# Patient Record
Sex: FEMALE | Race: WHITE | NOT HISPANIC OR LATINO | Employment: OTHER | URBAN - METROPOLITAN AREA
[De-identification: names, ages, dates, MRNs, and addresses within clinical notes are randomized per-mention and may not be internally consistent; named-entity substitution may affect disease eponyms.]

---

## 2023-03-14 LAB
ALANINE AMINOTRANSFERASE (SGPT) (U/L) IN SER/PLAS: 22 U/L (ref 7–45)
ALBUMIN (G/DL) IN SER/PLAS: 4.3 G/DL (ref 3.4–5)
ALKALINE PHOSPHATASE (U/L) IN SER/PLAS: 69 U/L (ref 33–136)
ANION GAP IN SER/PLAS: 13 MMOL/L (ref 10–20)
ASPARTATE AMINOTRANSFERASE (SGOT) (U/L) IN SER/PLAS: 21 U/L (ref 9–39)
BILIRUBIN TOTAL (MG/DL) IN SER/PLAS: 0.4 MG/DL (ref 0–1.2)
CALCIUM (MG/DL) IN SER/PLAS: 9.8 MG/DL (ref 8.6–10.3)
CARBON DIOXIDE, TOTAL (MMOL/L) IN SER/PLAS: 22 MMOL/L (ref 21–32)
CHLORIDE (MMOL/L) IN SER/PLAS: 108 MMOL/L (ref 98–107)
CHOLESTEROL (MG/DL) IN SER/PLAS: 151 MG/DL (ref 0–199)
CHOLESTEROL IN HDL (MG/DL) IN SER/PLAS: 65.5 MG/DL
CHOLESTEROL IN LDL (MG/DL) IN SER/PLAS BY DIRECT ASSAY: 71 MG/DL (ref 0–129)
CHOLESTEROL/HDL RATIO: 2.3
CREATININE (MG/DL) IN SER/PLAS: 1.59 MG/DL (ref 0.5–1.05)
GFR FEMALE: 35 ML/MIN/1.73M2
GLUCOSE (MG/DL) IN SER/PLAS: 86 MG/DL (ref 74–99)
LDL: 69 MG/DL (ref 0–99)
POTASSIUM (MMOL/L) IN SER/PLAS: 4.6 MMOL/L (ref 3.5–5.3)
PROTEIN TOTAL: 7.3 G/DL (ref 6.4–8.2)
SODIUM (MMOL/L) IN SER/PLAS: 138 MMOL/L (ref 136–145)
TRIGLYCERIDE (MG/DL) IN SER/PLAS: 85 MG/DL (ref 0–149)
UREA NITROGEN (MG/DL) IN SER/PLAS: 36 MG/DL (ref 6–23)
VLDL: 17 MG/DL (ref 0–40)

## 2023-05-16 DIAGNOSIS — I10 ESSENTIAL HYPERTENSION: Primary | ICD-10-CM

## 2023-05-16 RX ORDER — AMLODIPINE BESYLATE 10 MG/1
1 TABLET ORAL DAILY
COMMUNITY
End: 2023-05-16 | Stop reason: SDUPTHER

## 2023-05-17 RX ORDER — AMLODIPINE BESYLATE 10 MG/1
10 TABLET ORAL DAILY
Qty: 90 TABLET | Refills: 1 | Status: SHIPPED | OUTPATIENT
Start: 2023-05-17 | End: 2023-05-25 | Stop reason: SDUPTHER

## 2023-05-25 DIAGNOSIS — I10 ESSENTIAL HYPERTENSION: ICD-10-CM

## 2023-05-30 ENCOUNTER — TELEPHONE (OUTPATIENT)
Dept: PRIMARY CARE | Facility: CLINIC | Age: 73
End: 2023-05-30
Payer: MEDICARE

## 2023-05-30 DIAGNOSIS — I10 ESSENTIAL HYPERTENSION: ICD-10-CM

## 2023-05-30 RX ORDER — AMLODIPINE BESYLATE 10 MG/1
10 TABLET ORAL DAILY
Qty: 30 TABLET | Refills: 0 | Status: SHIPPED | OUTPATIENT
Start: 2023-05-30 | End: 2023-07-12 | Stop reason: SDUPTHER

## 2023-06-08 LAB
ALANINE AMINOTRANSFERASE (SGPT) (U/L) IN SER/PLAS: 19 U/L (ref 7–45)
ALBUMIN (G/DL) IN SER/PLAS: 4.4 G/DL (ref 3.4–5)
ALKALINE PHOSPHATASE (U/L) IN SER/PLAS: 74 U/L (ref 33–136)
ANION GAP IN SER/PLAS: 12 MMOL/L (ref 10–20)
ASPARTATE AMINOTRANSFERASE (SGOT) (U/L) IN SER/PLAS: 21 U/L (ref 9–39)
BILIRUBIN TOTAL (MG/DL) IN SER/PLAS: 0.5 MG/DL (ref 0–1.2)
CALCIUM (MG/DL) IN SER/PLAS: 9.9 MG/DL (ref 8.6–10.3)
CARBON DIOXIDE, TOTAL (MMOL/L) IN SER/PLAS: 24 MMOL/L (ref 21–32)
CHLORIDE (MMOL/L) IN SER/PLAS: 95 MMOL/L (ref 98–107)
CREATININE (MG/DL) IN SER/PLAS: 1.42 MG/DL (ref 0.5–1.05)
GFR FEMALE: 40 ML/MIN/1.73M2
GLUCOSE (MG/DL) IN SER/PLAS: 99 MG/DL (ref 74–99)
POC CALCIUM IONIZED (MMOL/L) IN BLOOD: 1.31 MMOL/L (ref 1.1–1.33)
POTASSIUM (MMOL/L) IN SER/PLAS: 4.1 MMOL/L (ref 3.5–5.3)
PROTEIN TOTAL: 7.3 G/DL (ref 6.4–8.2)
SODIUM (MMOL/L) IN SER/PLAS: 127 MMOL/L (ref 136–145)
UREA NITROGEN (MG/DL) IN SER/PLAS: 28 MG/DL (ref 6–23)

## 2023-06-09 LAB — PARATHYRIN INTACT (PG/ML) IN SER/PLAS: 26 PG/ML (ref 18.5–88)

## 2023-06-15 ENCOUNTER — OFFICE VISIT (OUTPATIENT)
Dept: PRIMARY CARE | Facility: CLINIC | Age: 73
End: 2023-06-15
Payer: MEDICARE

## 2023-06-15 VITALS
HEIGHT: 60 IN | TEMPERATURE: 97 F | SYSTOLIC BLOOD PRESSURE: 112 MMHG | OXYGEN SATURATION: 99 % | WEIGHT: 136 LBS | HEART RATE: 68 BPM | BODY MASS INDEX: 26.7 KG/M2 | DIASTOLIC BLOOD PRESSURE: 68 MMHG | RESPIRATION RATE: 16 BRPM

## 2023-06-15 DIAGNOSIS — H40.9 GLAUCOMA, UNSPECIFIED GLAUCOMA TYPE, UNSPECIFIED LATERALITY: ICD-10-CM

## 2023-06-15 DIAGNOSIS — M10.9 GOUT, UNSPECIFIED CAUSE, UNSPECIFIED CHRONICITY, UNSPECIFIED SITE: ICD-10-CM

## 2023-06-15 DIAGNOSIS — R92.1 CALCIFICATION OF LEFT BREAST ON MAMMOGRAPHY: ICD-10-CM

## 2023-06-15 DIAGNOSIS — M85.80 OSTEOPENIA, UNSPECIFIED LOCATION: ICD-10-CM

## 2023-06-15 DIAGNOSIS — Z23 ENCOUNTER FOR IMMUNIZATION: ICD-10-CM

## 2023-06-15 DIAGNOSIS — E66.3 OVERWEIGHT WITH BODY MASS INDEX (BMI) OF 27 TO 27.9 IN ADULT: ICD-10-CM

## 2023-06-15 DIAGNOSIS — E78.2 MIXED HYPERLIPIDEMIA: ICD-10-CM

## 2023-06-15 DIAGNOSIS — Z12.12 SCREENING FOR COLORECTAL CANCER: ICD-10-CM

## 2023-06-15 DIAGNOSIS — I10 ESSENTIAL HYPERTENSION: ICD-10-CM

## 2023-06-15 DIAGNOSIS — E87.1 HYPONATREMIA: ICD-10-CM

## 2023-06-15 DIAGNOSIS — I77.9 CAROTID ARTERY DISEASE, UNSPECIFIED LATERALITY, UNSPECIFIED TYPE (CMS-HCC): ICD-10-CM

## 2023-06-15 DIAGNOSIS — N60.02 CYST OF LEFT BREAST: ICD-10-CM

## 2023-06-15 DIAGNOSIS — N18.31 STAGE 3A CHRONIC KIDNEY DISEASE (MULTI): ICD-10-CM

## 2023-06-15 DIAGNOSIS — R73.03 PREDIABETES: ICD-10-CM

## 2023-06-15 DIAGNOSIS — Z12.11 SCREENING FOR COLORECTAL CANCER: ICD-10-CM

## 2023-06-15 DIAGNOSIS — Z00.00 MEDICARE ANNUAL WELLNESS VISIT, SUBSEQUENT: Primary | ICD-10-CM

## 2023-06-15 PROBLEM — D75.1 ERYTHROCYTOSIS: Status: RESOLVED | Noted: 2023-06-15 | Resolved: 2023-06-15

## 2023-06-15 PROBLEM — H10.10 ALLERGIC CONJUNCTIVITIS: Status: ACTIVE | Noted: 2023-06-15

## 2023-06-15 PROBLEM — R09.89 CAROTID BRUIT: Status: ACTIVE | Noted: 2023-06-15

## 2023-06-15 PROBLEM — L42 PITYRIASIS ROSEA: Status: RESOLVED | Noted: 2022-09-19 | Resolved: 2023-06-15

## 2023-06-15 PROBLEM — F41.9 ANXIETY: Status: RESOLVED | Noted: 2022-08-12 | Resolved: 2023-06-15

## 2023-06-15 PROBLEM — K13.0 ANGULAR CHEILITIS: Status: RESOLVED | Noted: 2023-06-15 | Resolved: 2023-06-15

## 2023-06-15 PROBLEM — J30.2 SEASONAL ALLERGIES: Status: ACTIVE | Noted: 2023-06-15

## 2023-06-15 PROBLEM — F41.9 ANXIETY: Status: ACTIVE | Noted: 2022-08-12

## 2023-06-15 PROCEDURE — 99214 OFFICE O/P EST MOD 30 MIN: CPT | Performed by: FAMILY MEDICINE

## 2023-06-15 PROCEDURE — 1160F RVW MEDS BY RX/DR IN RCRD: CPT | Performed by: FAMILY MEDICINE

## 2023-06-15 PROCEDURE — 1159F MED LIST DOCD IN RCRD: CPT | Performed by: FAMILY MEDICINE

## 2023-06-15 PROCEDURE — G0439 PPPS, SUBSEQ VISIT: HCPCS | Performed by: FAMILY MEDICINE

## 2023-06-15 PROCEDURE — 1036F TOBACCO NON-USER: CPT | Performed by: FAMILY MEDICINE

## 2023-06-15 PROCEDURE — 3078F DIAST BP <80 MM HG: CPT | Performed by: FAMILY MEDICINE

## 2023-06-15 PROCEDURE — 3008F BODY MASS INDEX DOCD: CPT | Performed by: FAMILY MEDICINE

## 2023-06-15 PROCEDURE — 1170F FXNL STATUS ASSESSED: CPT | Performed by: FAMILY MEDICINE

## 2023-06-15 PROCEDURE — 3074F SYST BP LT 130 MM HG: CPT | Performed by: FAMILY MEDICINE

## 2023-06-15 RX ORDER — OLOPATADINE HYDROCHLORIDE 1 MG/ML
1 SOLUTION/ DROPS OPHTHALMIC EVERY 12 HOURS
COMMUNITY
Start: 2020-08-13

## 2023-06-15 RX ORDER — HYDROXYZINE PAMOATE 25 MG/1
25 CAPSULE ORAL 3 TIMES DAILY PRN
COMMUNITY
Start: 2022-08-30

## 2023-06-15 RX ORDER — ALLOPURINOL 100 MG/1
100 TABLET ORAL DAILY
COMMUNITY
End: 2023-08-15 | Stop reason: SDUPTHER

## 2023-06-15 RX ORDER — TRIAMTERENE/HYDROCHLOROTHIAZID 37.5-25 MG
TABLET ORAL EVERY 24 HOURS
COMMUNITY
End: 2024-02-20 | Stop reason: SINTOL

## 2023-06-15 RX ORDER — ATORVASTATIN CALCIUM 40 MG/1
TABLET, FILM COATED ORAL EVERY 24 HOURS
COMMUNITY
End: 2023-11-27 | Stop reason: SDUPTHER

## 2023-06-15 RX ORDER — TRAVOPROST 0.04 MG/ML
SOLUTION/ DROPS OPHTHALMIC EVERY 24 HOURS
COMMUNITY
Start: 2023-06-05

## 2023-06-15 RX ORDER — LISINOPRIL 20 MG/1
20 TABLET ORAL DAILY
COMMUNITY
End: 2023-11-03 | Stop reason: DRUGHIGH

## 2023-06-15 RX ORDER — EZETIMIBE 10 MG/1
TABLET ORAL EVERY 24 HOURS
COMMUNITY
Start: 2022-12-01 | End: 2024-02-13 | Stop reason: SDUPTHER

## 2023-06-15 RX ORDER — FOLIC ACID 0.4 MG
1 TABLET ORAL DAILY
COMMUNITY

## 2023-06-15 RX ORDER — UBIDECARENONE 75 MG
500 CAPSULE ORAL
COMMUNITY
Start: 2022-08-12

## 2023-06-15 RX ORDER — DORZOLAMIDE HYDROCHLORIDE AND TIMOLOL MALEATE 20; 5 MG/ML; MG/ML
SOLUTION/ DROPS OPHTHALMIC EVERY 12 HOURS
COMMUNITY
Start: 2023-03-01

## 2023-06-15 RX ORDER — ASPIRIN 81 MG/1
1 TABLET ORAL DAILY
COMMUNITY

## 2023-06-15 ASSESSMENT — ENCOUNTER SYMPTOMS
SHORTNESS OF BREATH: 0
POLYDIPSIA: 0
DIARRHEA: 0
COUGH: 0
DIZZINESS: 0
CHILLS: 0
PALPITATIONS: 0
LIGHT-HEADEDNESS: 0
HEADACHES: 0
DYSURIA: 0
CONSTIPATION: 0
FEVER: 0
CHEST TIGHTNESS: 0
VOMITING: 0
NUMBNESS: 0
SINUS PAIN: 0
DYSPHORIC MOOD: 0
HEMATURIA: 0
POLYPHAGIA: 0
UNEXPECTED WEIGHT CHANGE: 0
DIAPHORESIS: 0
NERVOUS/ANXIOUS: 0
WHEEZING: 0
ABDOMINAL PAIN: 0
SINUS PRESSURE: 0
NAUSEA: 0
CONFUSION: 0
ADENOPATHY: 0
SORE THROAT: 0
FREQUENCY: 0

## 2023-06-15 ASSESSMENT — ACTIVITIES OF DAILY LIVING (ADL)
DOING_HOUSEWORK: INDEPENDENT
GROCERY_SHOPPING: INDEPENDENT
TAKING_MEDICATION: INDEPENDENT
DRESSING: INDEPENDENT
BATHING: INDEPENDENT
MANAGING_FINANCES: INDEPENDENT

## 2023-06-15 ASSESSMENT — PATIENT HEALTH QUESTIONNAIRE - PHQ9
2. FEELING DOWN, DEPRESSED OR HOPELESS: NOT AT ALL
SUM OF ALL RESPONSES TO PHQ9 QUESTIONS 1 AND 2: 0
1. LITTLE INTEREST OR PLEASURE IN DOING THINGS: NOT AT ALL

## 2023-06-15 NOTE — ASSESSMENT & PLAN NOTE
- DEXA 1/2023 showed normal bone mass.   - continue calcium plus D, weight bearing exercises  - recheck DEXA 1/2025

## 2023-06-15 NOTE — PATIENT INSTRUCTIONS
Jayshree Garcia ,    Thank you for coming in today. We at [facility] appreciate your trust in our care. If you have any questions or concerns about the care you received today, please do not hesitate to contact us at [phone].    The following instructions were discussed today:    - get blood work now to recheck your sodium level  - do cologuard to screen for colon cancer  - get mammogram and breast ultrasound in November 2023  - I recommend you get Tdap (tetanus) vaccine at your pharmacy.    -Follow up in 6 months   -Please get blood work done 1-2 weeks prior to your next visit.   - For blood work: Nothing to eat or drink for at least 10 hours prior. Okay for water or black coffee.

## 2023-06-15 NOTE — PROGRESS NOTES
"Subjective   Reason for Visit: Jayshree Garcia is an 73 y.o. female here for a Medicare Wellness visit.     Past Medical, Surgical, and Family History reviewed and updated in chart.    Reviewed all medications by prescribing practitioner or clinical pharmacist (such as prescriptions, OTCs, herbal therapies and supplements) and documented in the medical record.    routine follow up. chronic issues as per assessment and plan.         Patient Care Team:  Shalonda Multani MD as PCP - General     Review of Systems   Constitutional:  Negative for chills, diaphoresis, fever and unexpected weight change.   HENT:  Negative for congestion, sinus pressure, sinus pain, sneezing and sore throat.    Respiratory:  Negative for cough, chest tightness, shortness of breath and wheezing.    Cardiovascular:  Negative for chest pain, palpitations and leg swelling.   Gastrointestinal:  Negative for abdominal pain, constipation, diarrhea, nausea and vomiting.   Endocrine: Negative for cold intolerance, heat intolerance, polydipsia, polyphagia and polyuria.   Genitourinary:  Negative for dysuria, frequency, hematuria and urgency.   Neurological:  Negative for dizziness, syncope, light-headedness, numbness and headaches.   Hematological:  Negative for adenopathy.   Psychiatric/Behavioral:  Negative for confusion and dysphoric mood. The patient is not nervous/anxious.        Objective   Vitals:  /68 (BP Location: Right arm, Patient Position: Sitting, BP Cuff Size: Adult)   Pulse 68   Temp 36.1 °C (97 °F)   Resp 16   Ht 1.511 m (4' 11.5\")   Wt 61.7 kg (136 lb)   SpO2 99%   BMI 27.01 kg/m²       Physical Exam  Vitals and nursing note reviewed.   Constitutional:       General: She is not in acute distress.     Appearance: Normal appearance.   HENT:      Head: Normocephalic and atraumatic.      Nose: Nose normal.   Eyes:      Extraocular Movements: Extraocular movements intact.      Conjunctiva/sclera: Conjunctivae normal.      " Pupils: Pupils are equal, round, and reactive to light.   Cardiovascular:      Rate and Rhythm: Normal rate and regular rhythm.      Heart sounds: No murmur heard.     No friction rub. No gallop.   Pulmonary:      Effort: Pulmonary effort is normal.      Breath sounds: Normal breath sounds. No wheezing, rhonchi or rales.   Abdominal:      General: Bowel sounds are normal. There is no distension.      Palpations: Abdomen is soft.      Tenderness: There is no abdominal tenderness.   Musculoskeletal:         General: Normal range of motion.      Cervical back: Normal range of motion and neck supple.   Skin:     General: Skin is warm and dry.   Neurological:      General: No focal deficit present.      Mental Status: She is alert and oriented to person, place, and time.      Deep Tendon Reflexes: Reflexes normal.   Psychiatric:         Mood and Affect: Mood normal.         Behavior: Behavior normal.         Thought Content: Thought content normal.         Judgment: Judgment normal.         Assessment/Plan   Problem List Items Addressed This Visit       BMI 27.0-27.9,adult    Current Assessment & Plan     - Encouraged healthy lifestyle, including adequate exercise and high fiber, low fat and low carb diet.          Relevant Orders    CBC and Auto Differential    Comprehensive Metabolic Panel    TSH with reflex to Free T4 if abnormal    Calcification of left breast on mammography    Current Assessment & Plan     - stable on 5/2023 US. Repeat US in 6 months. Bilateral mammogram due at that time as well         Relevant Orders    CBC and Auto Differential    Comprehensive Metabolic Panel    TSH with reflex to Free T4 if abnormal    BI US breast complete left    BI mammo bilateral diagnostic    Carotid artery disease (CMS/HCC)    Current Assessment & Plan     - follows with cardiology          Relevant Orders    CBC and Auto Differential    Comprehensive Metabolic Panel    TSH with reflex to Free T4 if abnormal    Cyst of  left breast    Current Assessment & Plan     - stable on 5/2023 US. Repeat US in 6 months. Bilateral mammogram due at that time as well         Relevant Orders    CBC and Auto Differential    Comprehensive Metabolic Panel    TSH with reflex to Free T4 if abnormal    BI US breast complete left    BI mammo bilateral diagnostic    Essential hypertension    Current Assessment & Plan     - controlled. Continue amlodipine, lisinopril, maxzide          Relevant Orders    CBC and Auto Differential    Comprehensive Metabolic Panel    TSH with reflex to Free T4 if abnormal    Glaucoma    Current Assessment & Plan     - follows with ophthalmology          Relevant Orders    CBC and Auto Differential    Comprehensive Metabolic Panel    TSH with reflex to Free T4 if abnormal    Gout    Current Assessment & Plan     stable on allopurinol. uric acid 5.2          Relevant Orders    CBC and Auto Differential    Comprehensive Metabolic Panel    TSH with reflex to Free T4 if abnormal    Uric acid    Hyponatremia    Current Assessment & Plan     - sodium low at 127  - recheck sodium. If still low, will discontinue maxzide          Relevant Orders    CBC and Auto Differential    Comprehensive Metabolic Panel    TSH with reflex to Free T4 if abnormal    Basic metabolic panel    Mixed hyperlipidemia    Current Assessment & Plan     - controlled with LDL 71  - continue atorvastatin and lisinopril  - recheck labs in 6 months          Relevant Orders    CBC and Auto Differential    Comprehensive Metabolic Panel    Lipid Panel    TSH with reflex to Free T4 if abnormal    Osteopenia    Current Assessment & Plan     - DEXA 1/2023 showed normal bone mass.   - continue calcium plus D, weight bearing exercises  - recheck DEXA 1/2025         Relevant Orders    CBC and Auto Differential    Comprehensive Metabolic Panel    TSH with reflex to Free T4 if abnormal    Overweight with body mass index (BMI) of 27 to 27.9 in adult    Current Assessment &  Plan     - Encouraged healthy lifestyle, including adequate exercise and high fiber, low fat and low carb diet.          Relevant Orders    CBC and Auto Differential    Comprehensive Metabolic Panel    TSH with reflex to Free T4 if abnormal    Prediabetes    Current Assessment & Plan     - Encouraged healthy lifestyle, including adequate exercise and high fiber, low fat and low carb diet.          Relevant Orders    CBC and Auto Differential    Comprehensive Metabolic Panel    TSH with reflex to Free T4 if abnormal    Hemoglobin A1C    Stage 3a chronic kidney disease    Current Assessment & Plan     - follows with nephrology          Relevant Orders    CBC and Auto Differential    Comprehensive Metabolic Panel    TSH with reflex to Free T4 if abnormal     Other Visit Diagnoses       Medicare annual wellness visit, subsequent    -  Primary    Relevant Orders    CBC and Auto Differential    Comprehensive Metabolic Panel    TSH with reflex to Free T4 if abnormal    Encounter for immunization        Relevant Medications    diphth,pertus,acell,,tetanus (BoostRIX) 2.5-8-5 Lf-mcg-Lf/0.5mL injection    Screening for colorectal cancer        Relevant Orders    CBC and Auto Differential    Comprehensive Metabolic Panel    TSH with reflex to Free T4 if abnormal    Cologuard® colon cancer screening

## 2023-06-30 LAB — NONINV COLON CA DNA+OCC BLD SCRN STL QL: NEGATIVE

## 2023-07-12 RX ORDER — AMLODIPINE BESYLATE 10 MG/1
10 TABLET ORAL DAILY
Qty: 90 TABLET | Refills: 1 | Status: SHIPPED | OUTPATIENT
Start: 2023-07-12 | End: 2024-01-03 | Stop reason: SDUPTHER

## 2023-08-15 DIAGNOSIS — M10.9 GOUT, UNSPECIFIED CAUSE, UNSPECIFIED CHRONICITY, UNSPECIFIED SITE: Primary | ICD-10-CM

## 2023-08-15 RX ORDER — ALLOPURINOL 100 MG/1
100 TABLET ORAL DAILY
Qty: 90 TABLET | Refills: 1 | Status: SHIPPED | OUTPATIENT
Start: 2023-08-15 | End: 2023-08-16 | Stop reason: SDUPTHER

## 2023-08-16 DIAGNOSIS — M10.9 GOUT, UNSPECIFIED CAUSE, UNSPECIFIED CHRONICITY, UNSPECIFIED SITE: ICD-10-CM

## 2023-08-16 RX ORDER — ALLOPURINOL 100 MG/1
100 TABLET ORAL DAILY
Qty: 90 TABLET | Refills: 1 | Status: SHIPPED | OUTPATIENT
Start: 2023-08-16 | End: 2024-02-12

## 2023-08-16 NOTE — TELEPHONE ENCOUNTER
Patient is the one who called to request this, so she is currently taking. She is allergic to a specific brand of allopurinol, but it is only given through [facility]. She will be able to get a safe option through [facility]

## 2023-08-31 LAB
ALBUMIN (G/DL) IN SER/PLAS: 4.9 G/DL (ref 3.4–5)
ANION GAP IN SER/PLAS: 14 MMOL/L (ref 10–20)
CALCIDIOL (25 OH VITAMIN D3) (NG/ML) IN SER/PLAS: 40 NG/ML
CALCIUM (MG/DL) IN SER/PLAS: 10.4 MG/DL (ref 8.6–10.3)
CARBON DIOXIDE, TOTAL (MMOL/L) IN SER/PLAS: 23 MMOL/L (ref 21–32)
CHLORIDE (MMOL/L) IN SER/PLAS: 95 MMOL/L (ref 98–107)
CREATININE (MG/DL) IN SER/PLAS: 1.39 MG/DL (ref 0.5–1.05)
CREATININE (MG/DL) IN URINE: 33.7 MG/DL (ref 20–320)
ERYTHROCYTE DISTRIBUTION WIDTH (RATIO) BY AUTOMATED COUNT: 12.6 % (ref 11.5–14.5)
ERYTHROCYTE MEAN CORPUSCULAR HEMOGLOBIN CONCENTRATION (G/DL) BY AUTOMATED: 33.4 G/DL (ref 32–36)
ERYTHROCYTE MEAN CORPUSCULAR VOLUME (FL) BY AUTOMATED COUNT: 89 FL (ref 80–100)
ERYTHROCYTES (10*6/UL) IN BLOOD BY AUTOMATED COUNT: 4.66 X10E12/L (ref 4–5.2)
GFR FEMALE: 40 ML/MIN/1.73M2
GLUCOSE (MG/DL) IN SER/PLAS: 97 MG/DL (ref 74–99)
HEMATOCRIT (%) IN BLOOD BY AUTOMATED COUNT: 41.6 % (ref 36–46)
HEMOGLOBIN (G/DL) IN BLOOD: 13.9 G/DL (ref 12–16)
LEUKOCYTES (10*3/UL) IN BLOOD BY AUTOMATED COUNT: 6.6 X10E9/L (ref 4.4–11.3)
PHOSPHATE (MG/DL) IN SER/PLAS: 4.1 MG/DL (ref 2.5–4.9)
PLATELETS (10*3/UL) IN BLOOD AUTOMATED COUNT: 331 X10E9/L (ref 150–450)
POTASSIUM (MMOL/L) IN SER/PLAS: 4.4 MMOL/L (ref 3.5–5.3)
PROTEIN (MG/DL) IN URINE: <4 MG/DL (ref 5–24)
PROTEIN/CREATININE (MG/MG) IN URINE: ABNORMAL MG/MG CREAT (ref 0–0.17)
SODIUM (MMOL/L) IN SER/PLAS: 128 MMOL/L (ref 136–145)
UREA NITROGEN (MG/DL) IN SER/PLAS: 30 MG/DL (ref 6–23)

## 2023-09-01 LAB — PARATHYRIN INTACT (PG/ML) IN SER/PLAS: 27.4 PG/ML (ref 18.5–88)

## 2023-10-30 PROBLEM — I25.84 CORONARY ARTERY CALCIFICATION: Status: ACTIVE | Noted: 2023-10-30

## 2023-10-30 PROBLEM — I25.10 CORONARY ARTERY CALCIFICATION: Status: ACTIVE | Noted: 2023-10-30

## 2023-10-30 NOTE — PROGRESS NOTES
[facility] V[facility] Cardiology    Patient Name: Jayshree Garcia  Patient : 1950      Scribe Attestation  By signing my name below, IJackie Scribe   attest that this documentation has been prepared under the direction and in the presence of Jorge Farrell DO.       Reason for visit:  This is a 73-year-old female here for follow-up regarding coronary artery calcification, hypertension, dyslipidemia, and mild carotid artery disease.  The patient was last evaluated by me in 2022.     HPI:  This is a 73-year-old female here for follow-up regarding coronary artery calcification, hypertension, dyslipidemia, and mild carotid artery disease.  The patient was last evaluated by me in 2022.  At that visit I had recommended a coronary calcium score and asked the patient to follow-up in 1 year and sooner if necessary.  Coronary calcium score done in 2022 showed a total score of 1023.25 placing the patient in a high risk group.  I subsequently asked the patient to increase atorvastatin to 40 mg daily and add Zetia 10 mg daily for additional cholesterol reduction and ordered repeat CMP/lipid panel to be drawn in 3 months.  BMP done in 2023 showed a serum sodium of 128, serum potassium of 4.4, serum creatinine of 1.39 consistent with known CKD, CBC showed a hemoglobin of 13.9.  Lipid panel done in 2023 showed an LDL cholesterol of 69 and triglycerides of 85 while on atorvastatin 40 mg daily and Zetia 10 mg daily. ECG done today showed sinus rhythm with a heart rate of 77 bpm. The patient reports that she has been feeling generally well from the cardiac standpoint. She denies any new chest pain, shortness of breath, palpitations and lightheadedness. She states that her usual blood pressure at home is in the 120s range. She states that her PCP had recently adjusted her blood pressure medications. She states that she takes all of her medications as  prescribed. During my exam, she was resting comfortably on the exam table.        Assessment/Plan:   1. Coronary artery calcification  The previously reported chest pain has resolved.   Coronary calcium score done in November 2022 showed a total score of 1023.25 placing the patient in a high risk group.   ECG done today showed sinus rhythm with a heart rate of 77 bpm.   She denies anginal chest discomfort.  Blood pressure appears moderately controlled on exam today.   She should continue current antihypertensive medications and antiplatelet therapy.  Echocardiogram done on 10/27/2022 showed normal left ventricular systolic function with an ejection fraction of 60-65%, normal right ventricular systolic function, and no significant valve abnormalities.  Recent lab works as noted in the HPI.  Lipid panel done in March 2023 showed an LDL cholesterol of 69 and triglycerides of 85 while on atorvastatin 40 mg daily and Zetia 10 mg daily.  Lab works as noted below will be done in 6 months prior to his next visit.   Please see lifestyle recommendations below.  Follow up in 1 year and sooner if necessary.      2. Hypertension  The patient has a history of hypertension and blood pressure appears moderately controlled on exam today.   She states that her usual blood pressure at home is in the 120s range.  She should continue her current antihypertensive medications.      3. Dyslipidemia  Lipid panel done in March 2023 showed an LDL cholesterol of 69 and triglycerides of 85 while on atorvastatin 40 mg daily and Zetia 10 mg daily.  I will update lipid panel in 6 months.  Please see lifestyle recommendations below.      4. Carotid artery disease  Carotid artery duplex ultrasound done on 10/27/22 showed findings consistent with less than 50% stenosis of the right and left proximal ICA.   I will continue to monitor clinically for now.  Continue risk factor modification.    5. Lower extremity edema  The patient has 1+ pitting  bilateral lower extremity on exam today.  I discussed with him the importance of following a low-sodium heart healthy diet, wearing compression stockings and elevating legs when seated.     Orders:  CMP/lipid/magnesium/CBC in 6 months,   Follow-up in 1 year.      Lifestyle Recommendations  I recommend a whole-food plant-based diet, an eating pattern that encourages the consumption of unrefined plant foods (such as fruits, vegetables, tubers, whole grains, legumes, nuts and seeds) and discourages meats, dairy products, eggs and processed foods.     The AHA/ACC recommends that the patient consume a dietary pattern that emphasizes intake of vegetables, fruits, and whole grains; includes low-fat dairy products, poultry, fish, legumes, non-tropical vegetable oils, and nuts; and limits intake of sodium, sweets, sugar-sweetened beverages, and red meats.  Adapt this dietary pattern to appropriate calorie requirements (a 500-750 kcal/day deficit to loose weight), personal and cultural food preferences, and nutrition therapy for other medical conditions (including diabetes).  Achieve this pattern by following plans such as the [place] Mediterranean, DASH dietary pattern, or AHA diet.     Engage in 2 hours and 30 minutes per week of moderate-intensity physical activity, or 1 hour and 15 minutes (75 minutes) per week of vigorous-intensity aerobic physical activity, or an equivalent combination of moderate and vigorous-intensity aerobic physical activity. Aerobic activity should be performed in episodes of at least 10 minutes preferably spread throughout the week.     Adhering to a heart healthy diet, regular exercise habits, avoidance of tobacco products, and maintenance of a healthy weight are crucial components of their heart disease risk reduction.     Any positive review of systems not specifically addressed in the office visit today should be evaluated and treated by the patients primary care physician or in an emergency  department if necessary     Patient was notified that results from ordered tests will be called to the patient if it changes current management; it will otherwise be discussed at a future appointment and available on [facility].     Thank you for allowing me to participate in the care of this patient.        This document was generated using the assistance of voice recognition software. If there are any errors of spelling, grammar, syntax, or meaning; please feel free to contact me directly for clarification.    Past Medical History:  She has a past medical history of Acute atopic conjunctivitis, bilateral (06/09/2020), Acute vaginitis (11/09/2020), Cervical high risk human papillomavirus (HPV) DNA test positive, Encounter for other screening for malignant neoplasm of breast (10/21/2019), Erythrocytosis (06/15/2023), Mixed hyperlipidemia (10/06/2022), Mixed hyperlipidemia (10/06/2022), Mixed hyperlipidemia (10/06/2022), Mixed hyperlipidemia (10/06/2022), Other abnormal and inconclusive findings on diagnostic imaging of breast (11/04/2019), Other seasonal allergic rhinitis (06/09/2020), Personal history of diseases of the skin and subcutaneous tissue (09/19/2022), Personal history of other diseases of the digestive system (09/25/2019), Personal history of other drug therapy (12/03/2019), Personal history of other medical treatment (11/17/2022), Personal history of other specified conditions (11/04/2022), Pityriasis rosea (09/19/2022), Secondary polycythemia (08/15/2022), Secondary polycythemia (08/15/2022), and Secondary polycythemia (08/15/2022).    Past Surgical History:  She has a past surgical history that includes Other surgical history (10/21/2019).      Social History:  She reports that she has quit smoking. Her smoking use included cigarettes. She has never used smokeless tobacco. She reports current alcohol use of about 3.0 standard drinks of alcohol per week. She reports that she does not use  "drugs.    Family History:  No family history on file.     Allergies:  Acetaminophen, Allopurinol, Amlodipine, Atorvastatin, and Latanoprost    Outpatient Medications:  Current Outpatient Medications   Medication Instructions    allopurinol (ZYLOPRIM) 100 mg, oral, Daily    amLODIPine (NORVASC) 10 mg, oral, Daily    aspirin 81 mg EC tablet 1 tablet, oral, Daily    atorvastatin (Lipitor) 40 mg tablet Every 24 hours    cyanocobalamin (VITAMIN B-12) 500 mcg, oral, Daily RT    dorzolamide-timoloL (Cosopt) 22.3-6.8 mg/mL ophthalmic solution Every 12 hours    ezetimibe (Zetia) 10 mg tablet Every 24 hours    folic acid (Folvite) 400 mcg tablet 1 tablet, oral, Daily    hydrOXYzine pamoate (VISTARIL) 25 mg, oral, 3 times daily PRN    lisinopril 20 mg, oral, Daily    olopatadine (Patanol) 0.1 % ophthalmic solution 1 drop, Both Eyes, Every 12 hours    Travatan Z 0.004 % drops ophthalmic solution Every 24 hours    triamterene-hydrochlorothiazid (Maxzide-25) 37.5-25 mg tablet Every 24 hours        ROS:  A 14 point review of systems was done and is negative other than as stated in HPI    Vitals:      8/15/2022     4:14 PM 9/19/2022    11:18 AM 9/28/2022     3:09 PM 11/4/2022     1:39 PM 11/17/2022    10:18 AM 12/13/2022     8:43 AM 6/15/2023    10:09 AM   Vitals   Systolic 162 153 118 122 102 128 112   Diastolic 98 72 74 88 76 74 68   Heart Rate  89 77 76  74 68   Temp  36.1 °C (97 °F)     36.1 °C (97 °F)   Resp  16  16  16 16   Height (in)   1.49 m (4' 10.66\") 1.486 m (4' 10.5\") 1.486 m (4' 10.5\") 1.486 m (4' 10.5\") 1.511 m (4' 11.5\")   Weight (lb)   135 130 134.5 136 136   BMI   27.58 kg/m2 26.71 kg/m2 27.63 kg/m2 27.94 kg/m2 27.01 kg/m2   BSA (m2)   1.59 m2 1.56 m2 1.59 m2 1.6 m2 1.61 m2        Physical Exam:   Constitutional: Cooperative, in no acute distress, alert, appears stated age.   Skin: Skin color, texture, turgor normal. No rashes or lesions.   Head: Normocephalic. No masses, lesions, tenderness or abnormalities "   Eyes: Extraocular movements are grossly intact.   Mouth and throat: Mucous membranes moist   Neck: Neck supple, right carotid bruit, no JVD   Respiratory: Lungs clear to auscultation, no wheezing or rhonchi, no use of accessory muscles   Chest wall: No scars, normal excursion with respiration   Cardiovascular: Regular rhythm, + murmur  Gastrointestinal: Abdomen soft, nontender. Bowel sounds normal.   Musculoskeletal: Strength equal in upper extremities   Extremities: 1+ pitting edema   Neurologic: Sensation grossly intact, alert and oriented x3.    Intake/Output:   No intake/output data recorded.    Outpatient Medications  Current Outpatient Medications on File Prior to Visit   Medication Sig Dispense Refill    allopurinol (Zyloprim) 100 mg tablet Take 1 tablet (100 mg) by mouth once daily. 90 tablet 1    amLODIPine (Norvasc) 10 mg tablet Take 1 tablet (10 mg) by mouth once daily. 90 tablet 1    aspirin 81 mg EC tablet Take 1 tablet (81 mg) by mouth once daily.      atorvastatin (Lipitor) 40 mg tablet once every 24 hours.      cyanocobalamin (Vitamin B-12) 500 mcg tablet Take 1 tablet (500 mcg) by mouth once daily.      dorzolamide-timoloL (Cosopt) 22.3-6.8 mg/mL ophthalmic solution every 12 hours.      ezetimibe (Zetia) 10 mg tablet once every 24 hours.      folic acid (Folvite) 400 mcg tablet Take 1 tablet (0.4 mg) by mouth once daily.      hydrOXYzine pamoate (Vistaril) 25 mg capsule Take 1 capsule (25 mg) by mouth 3 times a day as needed for anxiety.      lisinopril 20 mg tablet Take 1 tablet (20 mg) by mouth once daily.      olopatadine (Patanol) 0.1 % ophthalmic solution Administer 1 drop into both eyes every 12 hours.      Travatan Z 0.004 % drops ophthalmic solution once every 24 hours.      triamterene-hydrochlorothiazid (Maxzide-25) 37.5-25 mg tablet once every 24 hours.       No current facility-administered medications on file prior to visit.       Labs: (past 26 weeks)  Recent Results (from the past  4368 hour(s))   CALCIUM, IONIZED    Collection Time: 06/08/23  9:37 AM   Result Value Ref Range    Calcium, Ion 1.31 1.10 - 1.33 mmol/L   Comprehensive Metabolic Panel    Collection Time: 06/08/23  9:37 AM   Result Value Ref Range    Glucose 99 74 - 99 mg/dL    Sodium 127 (L) 136 - 145 mmol/L    Potassium 4.1 3.5 - 5.3 mmol/L    Chloride 95 (L) 98 - 107 mmol/L    Bicarbonate 24 21 - 32 mmol/L    Anion Gap 12 10 - 20 mmol/L    Urea Nitrogen 28 (H) 6 - 23 mg/dL    Creatinine 1.42 (H) 0.50 - 1.05 mg/dL    GFR Female 40 (A) >90 mL/min/1.73m2    Calcium 9.9 8.6 - 10.3 mg/dL    Albumin 4.4 3.4 - 5.0 g/dL    Alkaline Phosphatase 74 33 - 136 U/L    Total Protein 7.3 6.4 - 8.2 g/dL    AST 21 9 - 39 U/L    Total Bilirubin 0.5 0.0 - 1.2 mg/dL    ALT (SGPT) 19 7 - 45 U/L   Parathyroid Hormone, Intact    Collection Time: 06/08/23  9:37 AM   Result Value Ref Range    PTH 26.0 18.5 - 88.0 pg/mL   Cologuard® colon cancer screening    Collection Time: 06/25/23  2:05 PM   Result Value Ref Range    NONINV COLON CA DNA+OCC BLD SCRN STL QL Negative Negative   Vitamin D 25-Hydroxy,Total (for eval of Vitamin D levels)    Collection Time: 08/31/23 10:46 AM   Result Value Ref Range    Vitamin D, 25-Hydroxy 40 ng/mL   Parathyroid Hormone, Intact    Collection Time: 08/31/23 10:46 AM   Result Value Ref Range    PTH 27.4 18.5 - 88.0 pg/mL   Protein, Urine Random    Collection Time: 08/31/23 10:46 AM   Result Value Ref Range    Protein, Ur <4 (L) 5 - 24 mg/dL    Creatinine, Urine 33.7 20.0 - 320.0 mg/dL    Prot/Creat, Ur SEE COMMENT 0.00 - 0.17 mg/mg Creat   Renal Function Panel    Collection Time: 08/31/23 10:46 AM   Result Value Ref Range    Glucose 97 74 - 99 mg/dL    Sodium 128 (L) 136 - 145 mmol/L    Potassium 4.4 3.5 - 5.3 mmol/L    Chloride 95 (L) 98 - 107 mmol/L    Bicarbonate 23 21 - 32 mmol/L    Anion Gap 14 10 - 20 mmol/L    Urea Nitrogen 30 (H) 6 - 23 mg/dL    Creatinine 1.39 (H) 0.50 - 1.05 mg/dL    GFR Female 40 (A) >90  mL/min/1.73m2    Calcium 10.4 (H) 8.6 - 10.3 mg/dL    Phosphorus 4.1 2.5 - 4.9 mg/dL    Albumin 4.9 3.4 - 5.0 g/dL   CBC    Collection Time: 08/31/23 10:46 AM   Result Value Ref Range    WBC 6.6 4.4 - 11.3 x10E9/L    RBC 4.66 4.00 - 5.20 x10E12/L    Hemoglobin 13.9 12.0 - 16.0 g/dL    Hematocrit 41.6 36.0 - 46.0 %    MCV 89 80 - 100 fL    MCHC 33.4 32.0 - 36.0 g/dL    Platelets 331 150 - 450 x10E9/L    RDW 12.6 11.5 - 14.5 %       ECG  No results found for this or any previous visit (from the past 4464 hour(s)).    Echocardiogram  No results found for this or any previous visit from the past 1095 days.      CV Studies:  EKG:No results found for this or any previous visit (from the past 4464 hour(s)).  Echocardiogram:   [place] [address]  Phone [phone] Fax [phone]    TRANSTHORACIC ECHOCARDIOGRAM REPORT      Patient Name:     JANICE Damon Physician:  [id number] Mathieu RODRIGEZ MD  Study Date:       10/27/2022       Referring           SOLA LUJAN  Physician:  MRN/PID:          [id number]         PCP:  Accession/Order#: [id number]     [facility] Echo Lab  Location:  YOB: 1950        Fellow:  Gender:           F                Nurse:  Admit Date:                        Sonographer:        Yamilka Ryan [facility]  Admission Status: Outpatient       Additional Staff:  Height:           149.86 cm        CC Report to:  Weight:           61.23 kg         Study Type:         Echocardiogram  BSA:              1.56 m2  Blood Pressure: 118 /74 mmHg    Diagnosis/ICD: I10-Essential (primary) hypertension; R07.9-Chest pain,  unspecified  Indication:    Hypertension, Chest Pain  Procedure/CPT: Echo Complete w Full [id number]    Patient History:  Pertinent History: HTN.    Study Detail: The following Echo studies were performed: 2D, M-Mode, Doppler and  color  flow.      PHYSICIAN INTERPRETATION:  Left Ventricle: Left ventricular systolic function is normal, with an estimated ejection fraction of 60-65%. There are no regional wall motion abnormalities. The left ventricular cavity size is normal. Spectral Doppler shows a normal pattern of left ventricular diastolic filling.  Left Atrium: The left atrium is normal in size.  Right Ventricle: The right ventricle is normal in size. There is normal right ventricular global systolic function.  Right Atrium: The right atrium is normal in size.  Aortic Valve: The aortic valve is probably trileaflet. There is no evidence of aortic valve regurgitation. The peak instantaneous gradient of the aortic valve is 11.3 mmHg. The mean gradient of the aortic valve is 6.0 mmHg.  Mitral Valve: The mitral valve is normal in structure. There is no evidence of mitral valve regurgitation.  Tricuspid Valve: The tricuspid valve is structurally normal. No evidence of tricuspid regurgitation.  Pulmonic Valve: The pulmonic valve is structurally normal. There is physiologic pulmonic valve regurgitation.  Pericardium: There is no pericardial effusion noted.  Aorta: The aortic root is normal.      CONCLUSIONS:  1. Left ventricular systolic function is normal with a 60-65% estimated ejection fraction.    QUANTITATIVE DATA SUMMARY:  2D MEASUREMENTS:  Normal Ranges:  LAs:           3.19 cm   (2.7-4.0cm)  IVSd:          0.81 cm   (0.6-1.1cm)  LVPWd:         0.85 cm   (0.6-1.1cm)  LVIDd:         3.66 cm   (3.9-5.9cm)  LVIDs:         2.42 cm  LV Mass Index: 54.3 g/m2  LV % FS        34.0 %    LA VOLUME:  Normal Ranges:  LA Vol A4C:        36.7 ml    (22+/-6mL/m2)  LA Vol A2C:        29.0 ml  LA Vol BP:         33.3 ml  LA Vol Index A4C:  23.5 ml/m2  LA Vol Index A2C:  18.6 ml/m2  LA Vol Index BP:   21.4 ml/m2  LA Area A4C:       14.7 cm2  LA Area A2C:       12.8 cm2  LA Major Axis A4C: 5.0 cm  LA Major Axis A2C: 4.8 cm  LA Volume Index:   21.0 ml/m2  LA Vol A4C:         35.1 ml  LA Vol A2C:        27.9 ml    RA VOLUME BY A/L METHOD:  Normal Ranges:  RA Area A4C: 11.5 cm2    AORTA MEASUREMENTS:  Normal Ranges:  Ao Sinus, d: 2.70 cm (2.1-3.5cm)  Ao STJ, d:   2.40 cm (1.7-3.4cm)  Asc Ao, d:   2.80 cm (2.1-3.4cm)    LV SYSTOLIC FUNCTION BY 2D PLANIMETRY (MOD):  Normal Ranges:  EF-A4C View: 69.6 % (>55%)  EF-A2C View: 65.7 %  EF-Biplane:  65.0 %    LV DIASTOLIC FUNCTION:  Normal Ranges:  MV Peak E:        0.81 m/s    (0.7-1.2 m/s)  MV Peak A:        0.89 m/s    (0.42-0.7 m/s)  E/A Ratio:        0.91        (1.0-2.2)  MV e'             0.10 m/s    (>8.0)  MV lateral e'     0.11 m/s  MV medial e'      0.09 m/s  MV A Dur:         117.03 msec  E/e' Ratio:       8.08        (<8.0)  PulmV A Revs Dur: 142.72 msec    MITRAL VALVE:  Normal Ranges:  MV DT: 190 msec (150-240msec)    AORTIC VALVE:  Normal Ranges:  AoV Vmax:                1.68 m/s  (<1.7m/s)  AoV Peak P.3 mmHg (<20mmHg)  AoV Mean P.0 mmHg  (1.7-11.5mmHg)  LVOT Max Filiberto:            1.57 m/s  (<1.1m/s)  AoV VTI:                 36.75 cm  (18-25cm)  LVOT VTI:                31.61 cm  LVOT Diameter:           1.78 cm   (1.8-2.4cm)  AoV Area, VTI:           2.14 cm2  (2.5-5.5cm2)  AoV Area,Vmax:           2.32 cm2  (2.5-4.5cm2)  AoV Dimensionless Index: 0.86    RIGHT VENTRICLE:  RV 1   3.0 cm  RV 2   2.1 cm  RV 3   6.6 cm  TAPSE: 20.2 mm  RV s'  0.13 m/s    TRICUSPID VALVE/RVSP:  Normal Ranges:  Peak TR Velocity: 1.09 m/s  RV Syst Pressure: 7.7 mmHg (< 30mmHg)  TV E Vmax:        0.45 m/s (0.3-0.7m/s)  TV A Vmax:        0.57 m/s  IVC Diam:         1.40 cm    Pulmonary Veins:  PulmV A Revs Dur: 142.72 msec    AORTA:  Asc Ao Diam 2.80 cm      [id number] Mathieu Mercer MD  Electronically signed on 10/27/2022 at 5:02:11 PM         Final     Stress Testing IMGRESULT([id number]:1:1825):   NM cardiac stress rest (myocardial perfusion MIBI) 10/07/2022    Narrative  MRN: [id number]  Patient Name: RAIN  JANICE    STUDY:  CARDIAC STRESS/REST INJECTION; PART 2 STRESS OR REST (NO CHARGE);  CARDIAC STRESS/REST (MYOCARDIAL PERFUSION/MIBI);  10/7/2022 11:11 am    INDICATION:  Chest Pain  I10: Essential hypertension E78.2: Mixed hyperlipidemia  R07.9: Chest pain.    COMPARISON:  None.    ACCESSION NUMBER(S):  [id number]; [id number]; [id number]    ORDERING CLINICIAN:  SOLA LUJAN    TECHNIQUE:  DIVISION OF NUCLEAR MEDICINE  STRESS MYOCARDIAL PERFUSION SCAN, ONE DAY PROTOCOL    The patient received an intravenous dose of  11.1 mCi of Tc-99m  tetrofosmin and resting emission tomographic (SPECT) images of the  myocardium were acquired. The patient then exercised via treadmill  stress to  101 % of MPHR and achieved  7.6 METS. At peak stress  34.6  mCi of Tc-99m tetrofosmin were administered and stress phase SPECT  images of the myocardium were then acquired. These included ECG-gated  images to assess and quantify ventricular function.    FINDINGS:    There is normal perfusion in all major segments. There is normal wall  motion and normal left ventricular function . The gated ejection  fraction is  92%(Normal over 50%).    Impression  1. Normal perfusion without evidence of ischemia or prior infarct.  2. Normal left ventricular systolic function.    Cardiac Catheterization: No results found for this or any previous visit from the past 1825 days.  No results found for this or any previous visit from the past 3650 days.     Cardiac Scoring:   CT heart calcium scoring wo IV contrast 11/30/2022    Narrative  MRN: [id number]  Patient Name: JANICE RODRIGEZ    STUDY:  CT CARDIAC SCORING;  11/30/2022 7:26 am    INDICATION:  Chest Pain  I10: Essential hypertension E78.2: Mixed hyperlipidemia  R07.9: Chest pain.    COMPARISON:  None.    ACCESSION NUMBER(S):  [id number]    ORDERING CLINICIAN:  SOLA LUJAN    TECHNIQUE:  Using prospective ECG gating, CT scan of the coronary arteries was  performed without intravenous contrast. Coronary calcium  "scoring  was  performed according to the method of Agatston.    FINDINGS:  The score and distribution of calcium in the coronary arteries is as  follows:    Left Main Coronary: 0.  Left Anterior Descendin.2.  Left Circumflex: 0.  Right Coronary Artery: 460.05.    Total: 1023.25.    There is bilateral dependent atelectasis.    The aorta is without aneurysmal dilatation evident.Calcified  atheromatous disease is seen of the aorta.    The heart is not enlarged. No pericardial effusion is evident.    No hilar or mediastinal lymphadenopathy is evident.    There is a hypodense structure in the left lobe of the liver which is  too small fully characterize but likely represents a cyst.    Impression  1. Coronary artery calcium score of 1023.25*.    *Coronary artery calcium scoring may be helpful in predicting the  risk for future coronary heart disease events.  According to the  American College of Cardiology Foundation Clinical Expert Consensus  Task Force, such testing provides important prognostic information in  patients with more than one coronary heart disease risk factor. The  coronary artery calcium score correlates with the annual risk of a  non-fatal myocardial infarction or coronary heart disease death.    Coronary artery score            Annual Risk    0-99                             0.4%  100-399                        1.3%  >400                            2.4%    These three \"breakpoints\" correspond to lower, intermediate and high  risk states for future coronary events.  Such information should be  used, along with appropriate clinical judgment, to make decisions  regarding the intensity of risk factor management strategies to treat  blood lipids and to modify other non-lipid coronary risk factors.    Reference: Ririe P et al. Circulation.  2007; 115:402-426    AAA : No results found for this or any previous visit from the past 1825 days.    OTHER: No results found for this or any previous visit " from the past 1825 days.    LAST IMAGING RESULTS  US renal complete  Narrative: Interpreted By:  DA CABALLERO MD  MRN: [id number]  Patient Name: JANICE RODRIGEZ     STUDY:  US RENAL BILAT;  9/18/2023 11:19 am     INDICATION:  kidney disease.     COMPARISON:  None.     ACCESSION NUMBER(S):  [id number]     ORDERING CLINICIAN:  MURIEL CONNER     TECHNIQUE:  Multiple images of the kidneys were obtained.     FINDINGS:  RIGHT KIDNEY:  9.4 cm in length. No hydronephrosis. No focal renal abnormality.     LEFT KIDNEY:  9 cm in length. No hydronephrosis. No focal renal abnormality.     BLADDER:  Unremarkable for degree of distention.     Impression: Diffusely increased renal cortical echogenicity suggestive of medical  renal disease.      Problem List Items Addressed This Visit       Carotid artery disease (CMS/HCC)    Relevant Orders    ECG 12 Lead (Completed)    Comprehensive Metabolic Panel    Lipid Panel    Magnesium    CBC    Follow Up In Cardiology    Essential hypertension    Relevant Orders    ECG 12 Lead (Completed)    Comprehensive Metabolic Panel    Lipid Panel    Magnesium    CBC    Follow Up In Cardiology    Mixed hyperlipidemia    Relevant Orders    ECG 12 Lead (Completed)    Comprehensive Metabolic Panel    Lipid Panel    Magnesium    CBC    Follow Up In Cardiology    Coronary artery calcification - Primary    Relevant Orders    ECG 12 Lead (Completed)    Comprehensive Metabolic Panel    Lipid Panel    Magnesium    CBC    Follow Up In Cardiology    Bilateral lower extremity edema            Jorge Farrell DO, FACC, FACOI

## 2023-11-03 ENCOUNTER — OFFICE VISIT (OUTPATIENT)
Dept: CARDIOLOGY | Facility: CLINIC | Age: 73
End: 2023-11-03
Payer: MEDICARE

## 2023-11-03 VITALS
SYSTOLIC BLOOD PRESSURE: 144 MMHG | BODY MASS INDEX: 27.7 KG/M2 | HEIGHT: 59 IN | HEART RATE: 77 BPM | WEIGHT: 137.4 LBS | DIASTOLIC BLOOD PRESSURE: 86 MMHG

## 2023-11-03 DIAGNOSIS — E78.2 MIXED HYPERLIPIDEMIA: ICD-10-CM

## 2023-11-03 DIAGNOSIS — R60.0 BILATERAL LOWER EXTREMITY EDEMA: ICD-10-CM

## 2023-11-03 DIAGNOSIS — I65.23 BILATERAL CAROTID ARTERY STENOSIS: ICD-10-CM

## 2023-11-03 DIAGNOSIS — I25.10 CORONARY ARTERY CALCIFICATION: Primary | ICD-10-CM

## 2023-11-03 DIAGNOSIS — I25.84 CORONARY ARTERY CALCIFICATION: Primary | ICD-10-CM

## 2023-11-03 DIAGNOSIS — I10 ESSENTIAL HYPERTENSION: ICD-10-CM

## 2023-11-03 PROCEDURE — 3079F DIAST BP 80-89 MM HG: CPT | Performed by: INTERNAL MEDICINE

## 2023-11-03 PROCEDURE — 93000 ELECTROCARDIOGRAM COMPLETE: CPT | Performed by: INTERNAL MEDICINE

## 2023-11-03 PROCEDURE — 3008F BODY MASS INDEX DOCD: CPT | Performed by: INTERNAL MEDICINE

## 2023-11-03 PROCEDURE — 99214 OFFICE O/P EST MOD 30 MIN: CPT | Performed by: INTERNAL MEDICINE

## 2023-11-03 PROCEDURE — 3077F SYST BP >= 140 MM HG: CPT | Performed by: INTERNAL MEDICINE

## 2023-11-03 PROCEDURE — 1159F MED LIST DOCD IN RCRD: CPT | Performed by: INTERNAL MEDICINE

## 2023-11-03 PROCEDURE — 1160F RVW MEDS BY RX/DR IN RCRD: CPT | Performed by: INTERNAL MEDICINE

## 2023-11-03 PROCEDURE — 1036F TOBACCO NON-USER: CPT | Performed by: INTERNAL MEDICINE

## 2023-11-16 ENCOUNTER — HOSPITAL ENCOUNTER (OUTPATIENT)
Dept: RADIOLOGY | Facility: HOSPITAL | Age: 73
Discharge: HOME | End: 2023-11-16
Payer: MEDICARE

## 2023-11-16 DIAGNOSIS — N60.02 SOLITARY CYST OF LEFT BREAST: ICD-10-CM

## 2023-11-16 DIAGNOSIS — R92.1 MAMMOGRAPHIC CALCIFICATION FOUND ON DIAGNOSTIC IMAGING OF BREAST: ICD-10-CM

## 2023-11-16 PROCEDURE — G0279 TOMOSYNTHESIS, MAMMO: HCPCS

## 2023-11-16 PROCEDURE — 77066 DX MAMMO INCL CAD BI: CPT

## 2023-11-16 PROCEDURE — 76642 ULTRASOUND BREAST LIMITED: CPT

## 2023-11-16 PROCEDURE — 76641 ULTRASOUND BREAST COMPLETE: CPT | Mod: LT

## 2023-11-27 DIAGNOSIS — E78.2 MIXED HYPERLIPIDEMIA: Primary | ICD-10-CM

## 2023-11-27 RX ORDER — ATORVASTATIN CALCIUM 40 MG/1
40 TABLET, FILM COATED ORAL DAILY
Qty: 90 TABLET | Refills: 0 | Status: SHIPPED | OUTPATIENT
Start: 2023-11-27 | End: 2024-02-20 | Stop reason: SDUPTHER

## 2023-12-12 ENCOUNTER — APPOINTMENT (OUTPATIENT)
Dept: PRIMARY CARE | Facility: CLINIC | Age: 73
End: 2023-12-12
Payer: MEDICARE

## 2023-12-28 ENCOUNTER — LAB (OUTPATIENT)
Dept: LAB | Facility: LAB | Age: 73
End: 2023-12-28
Payer: MEDICARE

## 2023-12-28 DIAGNOSIS — Z00.00 MEDICARE ANNUAL WELLNESS VISIT, SUBSEQUENT: ICD-10-CM

## 2023-12-28 DIAGNOSIS — H40.9 GLAUCOMA, UNSPECIFIED GLAUCOMA TYPE, UNSPECIFIED LATERALITY: ICD-10-CM

## 2023-12-28 DIAGNOSIS — M85.80 OSTEOPENIA, UNSPECIFIED LOCATION: ICD-10-CM

## 2023-12-28 DIAGNOSIS — Z12.12 SCREENING FOR COLORECTAL CANCER: ICD-10-CM

## 2023-12-28 DIAGNOSIS — R73.03 PREDIABETES: ICD-10-CM

## 2023-12-28 DIAGNOSIS — E78.2 MIXED HYPERLIPIDEMIA: ICD-10-CM

## 2023-12-28 DIAGNOSIS — Z12.11 SCREENING FOR COLORECTAL CANCER: ICD-10-CM

## 2023-12-28 DIAGNOSIS — I77.9 CAROTID ARTERY DISEASE, UNSPECIFIED LATERALITY, UNSPECIFIED TYPE (CMS-HCC): ICD-10-CM

## 2023-12-28 DIAGNOSIS — N18.31 STAGE 3A CHRONIC KIDNEY DISEASE (MULTI): ICD-10-CM

## 2023-12-28 DIAGNOSIS — R92.1 CALCIFICATION OF LEFT BREAST ON MAMMOGRAPHY: ICD-10-CM

## 2023-12-28 DIAGNOSIS — M10.9 GOUT, UNSPECIFIED CAUSE, UNSPECIFIED CHRONICITY, UNSPECIFIED SITE: ICD-10-CM

## 2023-12-28 DIAGNOSIS — E66.3 OVERWEIGHT WITH BODY MASS INDEX (BMI) OF 27 TO 27.9 IN ADULT: ICD-10-CM

## 2023-12-28 DIAGNOSIS — I10 ESSENTIAL HYPERTENSION: ICD-10-CM

## 2023-12-28 DIAGNOSIS — N60.02 CYST OF LEFT BREAST: ICD-10-CM

## 2023-12-28 DIAGNOSIS — E87.1 HYPONATREMIA: ICD-10-CM

## 2023-12-28 LAB
ALBUMIN SERPL BCP-MCNC: 4.3 G/DL (ref 3.4–5)
ALP SERPL-CCNC: 84 U/L (ref 33–136)
ALT SERPL W P-5'-P-CCNC: 22 U/L (ref 7–45)
ANION GAP SERPL CALC-SCNC: 13 MMOL/L (ref 10–20)
AST SERPL W P-5'-P-CCNC: 24 U/L (ref 9–39)
BASOPHILS # BLD AUTO: 0.03 X10*3/UL (ref 0–0.1)
BASOPHILS NFR BLD AUTO: 0.5 %
BILIRUB SERPL-MCNC: 0.6 MG/DL (ref 0–1.2)
BUN SERPL-MCNC: 15 MG/DL (ref 6–23)
CALCIUM SERPL-MCNC: 9.7 MG/DL (ref 8.6–10.3)
CHLORIDE SERPL-SCNC: 103 MMOL/L (ref 98–107)
CHOLEST SERPL-MCNC: 173 MG/DL (ref 0–199)
CHOLESTEROL/HDL RATIO: 2.3
CO2 SERPL-SCNC: 26 MMOL/L (ref 21–32)
CREAT SERPL-MCNC: 1.09 MG/DL (ref 0.5–1.05)
EOSINOPHIL # BLD AUTO: 0.15 X10*3/UL (ref 0–0.4)
EOSINOPHIL NFR BLD AUTO: 2.3 %
ERYTHROCYTE [DISTWIDTH] IN BLOOD BY AUTOMATED COUNT: 13.2 % (ref 11.5–14.5)
EST. AVERAGE GLUCOSE BLD GHB EST-MCNC: 120 MG/DL
GFR SERPL CREATININE-BSD FRML MDRD: 54 ML/MIN/1.73M*2
GLUCOSE SERPL-MCNC: 91 MG/DL (ref 74–99)
HBA1C MFR BLD: 5.8 %
HCT VFR BLD AUTO: 44.2 % (ref 36–46)
HDLC SERPL-MCNC: 75.6 MG/DL
HGB BLD-MCNC: 14.2 G/DL (ref 12–16)
IMM GRANULOCYTES # BLD AUTO: 0.01 X10*3/UL (ref 0–0.5)
IMM GRANULOCYTES NFR BLD AUTO: 0.2 % (ref 0–0.9)
LDLC SERPL CALC-MCNC: 72 MG/DL
LYMPHOCYTES # BLD AUTO: 1.74 X10*3/UL (ref 0.8–3)
LYMPHOCYTES NFR BLD AUTO: 26.4 %
MCH RBC QN AUTO: 29.3 PG (ref 26–34)
MCHC RBC AUTO-ENTMCNC: 32.1 G/DL (ref 32–36)
MCV RBC AUTO: 91 FL (ref 80–100)
MONOCYTES # BLD AUTO: 0.56 X10*3/UL (ref 0.05–0.8)
MONOCYTES NFR BLD AUTO: 8.5 %
NEUTROPHILS # BLD AUTO: 4.1 X10*3/UL (ref 1.6–5.5)
NEUTROPHILS NFR BLD AUTO: 62.1 %
NON HDL CHOLESTEROL: 97 MG/DL (ref 0–149)
NRBC BLD-RTO: 0 /100 WBCS (ref 0–0)
PLATELET # BLD AUTO: 261 X10*3/UL (ref 150–450)
POTASSIUM SERPL-SCNC: 3.9 MMOL/L (ref 3.5–5.3)
PROT SERPL-MCNC: 7.1 G/DL (ref 6.4–8.2)
RBC # BLD AUTO: 4.85 X10*6/UL (ref 4–5.2)
SODIUM SERPL-SCNC: 138 MMOL/L (ref 136–145)
TRIGL SERPL-MCNC: 126 MG/DL (ref 0–149)
TSH SERPL-ACNC: 3.96 MIU/L (ref 0.44–3.98)
URATE SERPL-MCNC: 5.2 MG/DL (ref 2.3–6.7)
VLDL: 25 MG/DL (ref 0–40)
WBC # BLD AUTO: 6.6 X10*3/UL (ref 4.4–11.3)

## 2023-12-28 PROCEDURE — 80061 LIPID PANEL: CPT

## 2023-12-28 PROCEDURE — 85025 COMPLETE CBC W/AUTO DIFF WBC: CPT

## 2023-12-28 PROCEDURE — 80053 COMPREHEN METABOLIC PANEL: CPT

## 2023-12-28 PROCEDURE — 36415 COLL VENOUS BLD VENIPUNCTURE: CPT

## 2023-12-28 PROCEDURE — 83036 HEMOGLOBIN GLYCOSYLATED A1C: CPT

## 2023-12-28 PROCEDURE — 84550 ASSAY OF BLOOD/URIC ACID: CPT

## 2023-12-28 PROCEDURE — 84443 ASSAY THYROID STIM HORMONE: CPT

## 2024-01-03 DIAGNOSIS — I10 ESSENTIAL HYPERTENSION: ICD-10-CM

## 2024-01-03 RX ORDER — AMLODIPINE BESYLATE 10 MG/1
10 TABLET ORAL DAILY
Qty: 90 TABLET | Refills: 1 | Status: SHIPPED | OUTPATIENT
Start: 2024-01-03 | End: 2024-07-01

## 2024-01-04 ENCOUNTER — APPOINTMENT (OUTPATIENT)
Dept: PRIMARY CARE | Facility: CLINIC | Age: 74
End: 2024-01-04
Payer: MEDICARE

## 2024-02-13 DIAGNOSIS — E78.2 MIXED HYPERLIPIDEMIA: Primary | ICD-10-CM

## 2024-02-13 RX ORDER — EZETIMIBE 10 MG/1
10 TABLET ORAL DAILY
Qty: 90 TABLET | Refills: 3 | Status: SHIPPED | OUTPATIENT
Start: 2024-02-13

## 2024-02-20 ENCOUNTER — OFFICE VISIT (OUTPATIENT)
Dept: PRIMARY CARE | Facility: CLINIC | Age: 74
End: 2024-02-20
Payer: MEDICARE

## 2024-02-20 VITALS
HEIGHT: 59 IN | OXYGEN SATURATION: 98 % | BODY MASS INDEX: 27.82 KG/M2 | DIASTOLIC BLOOD PRESSURE: 72 MMHG | WEIGHT: 138 LBS | SYSTOLIC BLOOD PRESSURE: 124 MMHG | RESPIRATION RATE: 15 BRPM | HEART RATE: 72 BPM | TEMPERATURE: 97.3 F

## 2024-02-20 DIAGNOSIS — N18.31 STAGE 3A CHRONIC KIDNEY DISEASE (MULTI): ICD-10-CM

## 2024-02-20 DIAGNOSIS — R73.03 PREDIABETES: ICD-10-CM

## 2024-02-20 DIAGNOSIS — I77.9 CAROTID ARTERY DISEASE, UNSPECIFIED LATERALITY, UNSPECIFIED TYPE (CMS-HCC): ICD-10-CM

## 2024-02-20 DIAGNOSIS — E78.2 MIXED HYPERLIPIDEMIA: ICD-10-CM

## 2024-02-20 DIAGNOSIS — M10.9 GOUT, UNSPECIFIED CAUSE, UNSPECIFIED CHRONICITY, UNSPECIFIED SITE: Primary | ICD-10-CM

## 2024-02-20 DIAGNOSIS — Z11.59 NEED FOR HEPATITIS C SCREENING TEST: ICD-10-CM

## 2024-02-20 DIAGNOSIS — R92.1 CALCIFICATION OF LEFT BREAST ON MAMMOGRAPHY: ICD-10-CM

## 2024-02-20 DIAGNOSIS — I25.84 CORONARY ARTERY CALCIFICATION: ICD-10-CM

## 2024-02-20 DIAGNOSIS — I25.10 CORONARY ARTERY CALCIFICATION: ICD-10-CM

## 2024-02-20 PROCEDURE — 1036F TOBACCO NON-USER: CPT | Performed by: FAMILY MEDICINE

## 2024-02-20 PROCEDURE — 3078F DIAST BP <80 MM HG: CPT | Performed by: FAMILY MEDICINE

## 2024-02-20 PROCEDURE — 3008F BODY MASS INDEX DOCD: CPT | Performed by: FAMILY MEDICINE

## 2024-02-20 PROCEDURE — 99214 OFFICE O/P EST MOD 30 MIN: CPT | Performed by: FAMILY MEDICINE

## 2024-02-20 PROCEDURE — 3074F SYST BP LT 130 MM HG: CPT | Performed by: FAMILY MEDICINE

## 2024-02-20 PROCEDURE — 1160F RVW MEDS BY RX/DR IN RCRD: CPT | Performed by: FAMILY MEDICINE

## 2024-02-20 PROCEDURE — 1159F MED LIST DOCD IN RCRD: CPT | Performed by: FAMILY MEDICINE

## 2024-02-20 RX ORDER — LISINOPRIL 40 MG/1
40 TABLET ORAL DAILY
COMMUNITY

## 2024-02-20 RX ORDER — ALLOPURINOL 100 MG/1
100 TABLET ORAL DAILY
COMMUNITY
End: 2024-02-20 | Stop reason: SDUPTHER

## 2024-02-20 RX ORDER — ALLOPURINOL 100 MG/1
100 TABLET ORAL DAILY
Qty: 90 TABLET | Refills: 1 | Status: SHIPPED | OUTPATIENT
Start: 2024-02-20 | End: 2024-08-18

## 2024-02-20 RX ORDER — ATORVASTATIN CALCIUM 40 MG/1
40 TABLET, FILM COATED ORAL DAILY
Qty: 90 TABLET | Refills: 1 | Status: SHIPPED | OUTPATIENT
Start: 2024-02-20 | End: 2024-06-03 | Stop reason: CLARIF

## 2024-02-20 ASSESSMENT — ENCOUNTER SYMPTOMS
DIFFICULTY URINATING: 0
FATIGUE: 0
MYALGIAS: 0
DYSPHORIC MOOD: 0
VOMITING: 0
POLYDIPSIA: 0
DIZZINESS: 0
BLOOD IN STOOL: 0
PALPITATIONS: 0
SHORTNESS OF BREATH: 0
ABDOMINAL PAIN: 0
DYSURIA: 0
HEADACHES: 0
CONSTIPATION: 0
SLEEP DISTURBANCE: 0
ARTHRALGIAS: 0
POLYPHAGIA: 0
DIARRHEA: 0
NAUSEA: 0

## 2024-02-20 NOTE — PATIENT INSTRUCTIONS
Recommend a predominant low fat whole foods plant based diet.  Cut back on meat, dairy, processed carbs, salt and oils. Increase fiber in your diet.  Decrease alcohol as much as possible if you drink. Recommend regular exercise most days of the week(goal up to 150min per week). Also recommend good sleep habits aiming for 7-8 hours per night.     Continue your current meds    Follow up with your specialists as scheduled    Return in 4 months for recheck and wellness visit, sooner if needed

## 2024-02-20 NOTE — PROGRESS NOTES
"Subjective   Patient ID: Jayshree Garcia is a 73 y.o. female who presents for New Patient Visit (General check up) and multiple issues.     HPI     Gout: stable. Has not had flare for yrs  Predm: have been stable. Last A1c 5.8%  Lipids: controlled. Last HDL 75, LDL 72,   HTN; controlled.   CKD; stable. Cr 1.09, GFR 54. Seeing nephrology  CAD: CAC >1000 per records. Seeing cardiology.   PVD: stable. On statin    Review of Systems   Constitutional:  Negative for fatigue.   HENT: Negative.     Eyes:  Negative for visual disturbance.   Respiratory:  Negative for shortness of breath.    Cardiovascular:  Negative for chest pain and palpitations.   Gastrointestinal:  Negative for abdominal pain, blood in stool, constipation, diarrhea, nausea and vomiting.   Endocrine: Negative for cold intolerance, heat intolerance, polydipsia, polyphagia and polyuria.   Genitourinary:  Negative for difficulty urinating and dysuria.   Musculoskeletal:  Negative for arthralgias and myalgias.   Skin:  Negative for rash.   Neurological:  Negative for dizziness and headaches.   Psychiatric/Behavioral:  Negative for dysphoric mood and sleep disturbance.        Objective   /72   Pulse 72   Temp 36.3 °C (97.3 °F)   Resp 15   Ht 1.499 m (4' 11\")   Wt 62.6 kg (138 lb)   SpO2 98%   BMI 27.87 kg/m²     Physical Exam  Vitals and nursing note reviewed.   Constitutional:       General: She is not in acute distress.     Appearance: Normal appearance. She is not toxic-appearing.   HENT:      Head: Normocephalic.   Eyes:      Pupils: Pupils are equal, round, and reactive to light.   Neck:      Vascular: No carotid bruit.   Cardiovascular:      Rate and Rhythm: Normal rate and regular rhythm.      Heart sounds: No murmur heard.  Pulmonary:      Effort: Pulmonary effort is normal. No respiratory distress.      Breath sounds: Normal breath sounds.   Abdominal:      General: Bowel sounds are normal.      Palpations: Abdomen is soft.      " Tenderness: There is no abdominal tenderness. There is no guarding.   Musculoskeletal:         General: No tenderness.      Right lower leg: No edema.      Left lower leg: No edema.   Skin:     General: Skin is warm.   Neurological:      General: No focal deficit present.      Mental Status: She is alert.      Cranial Nerves: No cranial nerve deficit.   Psychiatric:         Mood and Affect: Mood normal.         Assessment/Plan   Problem List Items Addressed This Visit             ICD-10-CM    Calcification of left breast on mammography R92.1    Carotid artery disease (CMS/HCC) I77.9    Gout - Primary M10.9    Relevant Medications    allopurinol (Zyloprim) 100 mg tablet    Mixed hyperlipidemia E78.2    Relevant Medications    atorvastatin (Lipitor) 40 mg tablet    Other Relevant Orders    Comprehensive Metabolic Panel    Prediabetes R73.03    Relevant Orders    Comprehensive Metabolic Panel    Hemoglobin A1C    Stage 3a chronic kidney disease (CMS/HCC) N18.31    Coronary artery calcification I25.10, I25.84     Other Visit Diagnoses         Codes    Need for hepatitis C screening test     Z11.59    Relevant Orders    Hepatitis C Antibody             Reports able to tolerate atorvastatin ordered from [facility] and [facility]opurinol from [facility].  Patient apparently has some intolerance to certain fillers per patient    Reviewed prior blood work and imaging

## 2024-02-23 ENCOUNTER — LAB (OUTPATIENT)
Dept: LAB | Facility: LAB | Age: 74
End: 2024-02-23
Payer: MEDICARE

## 2024-02-23 DIAGNOSIS — N18.31 CHRONIC KIDNEY DISEASE, STAGE 3A (MULTI): Primary | ICD-10-CM

## 2024-02-23 LAB
25(OH)D3 SERPL-MCNC: 41 NG/ML (ref 30–100)
ALBUMIN SERPL BCP-MCNC: 4.6 G/DL (ref 3.4–5)
ANION GAP SERPL CALC-SCNC: 15 MMOL/L (ref 10–20)
BUN SERPL-MCNC: 19 MG/DL (ref 6–23)
CALCIUM SERPL-MCNC: 10.4 MG/DL (ref 8.6–10.3)
CHLORIDE SERPL-SCNC: 105 MMOL/L (ref 98–107)
CO2 SERPL-SCNC: 23 MMOL/L (ref 21–32)
CREAT SERPL-MCNC: 1.09 MG/DL (ref 0.5–1.05)
EGFRCR SERPLBLD CKD-EPI 2021: 54 ML/MIN/1.73M*2
ERYTHROCYTE [DISTWIDTH] IN BLOOD BY AUTOMATED COUNT: 14 % (ref 11.5–14.5)
GLUCOSE SERPL-MCNC: 89 MG/DL (ref 74–99)
HCT VFR BLD AUTO: 47.2 % (ref 36–46)
HGB BLD-MCNC: 15.4 G/DL (ref 12–16)
MCH RBC QN AUTO: 29.8 PG (ref 26–34)
MCHC RBC AUTO-ENTMCNC: 32.6 G/DL (ref 32–36)
MCV RBC AUTO: 92 FL (ref 80–100)
NRBC BLD-RTO: 0 /100 WBCS (ref 0–0)
PHOSPHATE SERPL-MCNC: 3.5 MG/DL (ref 2.5–4.9)
PLATELET # BLD AUTO: 331 X10*3/UL (ref 150–450)
POTASSIUM SERPL-SCNC: 3.8 MMOL/L (ref 3.5–5.3)
PTH-INTACT SERPL-MCNC: 49.4 PG/ML (ref 18.5–88)
RBC # BLD AUTO: 5.16 X10*6/UL (ref 4–5.2)
SODIUM SERPL-SCNC: 139 MMOL/L (ref 136–145)
WBC # BLD AUTO: 6.8 X10*3/UL (ref 4.4–11.3)

## 2024-02-23 PROCEDURE — 36415 COLL VENOUS BLD VENIPUNCTURE: CPT

## 2024-02-23 PROCEDURE — 83970 ASSAY OF PARATHORMONE: CPT

## 2024-02-23 PROCEDURE — 80069 RENAL FUNCTION PANEL: CPT

## 2024-02-23 PROCEDURE — 85027 COMPLETE CBC AUTOMATED: CPT

## 2024-02-23 PROCEDURE — 82306 VITAMIN D 25 HYDROXY: CPT

## 2024-02-29 ENCOUNTER — LAB (OUTPATIENT)
Dept: LAB | Facility: LAB | Age: 74
End: 2024-02-29
Payer: MEDICARE

## 2024-02-29 DIAGNOSIS — N18.31 CHRONIC KIDNEY DISEASE, STAGE 3A (MULTI): Primary | ICD-10-CM

## 2024-02-29 LAB — CREAT UR-MCNC: 44.6 MG/DL (ref 20–320)

## 2024-02-29 PROCEDURE — 82570 ASSAY OF URINE CREATININE: CPT

## 2024-03-19 PROBLEM — R87.810 HUMAN PAPILLOMAVIRUS (HPV) TYPE 16 DNA DETECTED IN CERVICAL SPECIMEN: Status: ACTIVE | Noted: 2024-03-19

## 2024-03-19 PROBLEM — Z01.411 ENCOUNTER FOR WELL WOMAN EXAM WITH ABNORMAL FINDINGS: Status: ACTIVE | Noted: 2024-03-19

## 2024-03-19 NOTE — PROGRESS NOTES
Subjective   Patient ID: Jayshree Garcia is a 73 y.o. female who presents for Annual Exam (Denies problems.).  Pap and HPV were negative 10/29/2020. She has been diagnosed with kidney disease.  She is s/p LEEP in 2016 for HPV 16 positive pap and inadequate colposcopy with final pathology without dysplasia but with HPV changes. Cervical cancer screening will be needed until 2036.   DEXA 1/16/2023 returned with normal bone density.   Mammogram returned normal with plans to return to annual screening, next due November 2024. She had a benign appearing breast cyst which had required follow up imaging.         Review of Systems   Constitutional:  Negative for activity change.   HENT:  Negative for congestion.    Respiratory:  Negative for apnea and cough.    Cardiovascular:  Negative for chest pain.   Gastrointestinal:  Negative for constipation and diarrhea.   Genitourinary:  Negative for hematuria and vaginal pain.   Musculoskeletal:  Negative for joint swelling.   Neurological:  Negative for dizziness.   Psychiatric/Behavioral:  Negative for agitation.        Past Medical History:   Diagnosis Date    Acute atopic conjunctivitis, bilateral 06/09/2020    Allergic conjunctivitis of both eyes    Acute vaginitis 11/09/2020    Bacterial vaginosis    Allergic     Cervical high risk human papillomavirus (HPV) DNA test positive     Cervical high risk HPV (human papillomavirus) test positive    Chronic kidney disease 2023    Encounter for other screening for malignant neoplasm of breast 10/21/2019    Screening for breast cancer    Erythrocytosis 06/15/2023    Glaucoma 1998    Hypertension 1998    Mixed hyperlipidemia 10/06/2022    Mixed hyperlipidemia    Mixed hyperlipidemia 10/06/2022    Mixed hyperlipidemia    Mixed hyperlipidemia 10/06/2022    Mixed hyperlipidemia    Mixed hyperlipidemia 10/06/2022    Mixed hyperlipidemia    Other abnormal and inconclusive findings on diagnostic imaging of breast 11/04/2019    Abnormal  mammogram of left breast    Other seasonal allergic rhinitis 06/09/2020    Seasonal allergies    Personal history of diseases of the skin and subcutaneous tissue 09/19/2022    History of pityriasis rosea    Personal history of other diseases of the digestive system 09/25/2019    History of angular cheilitis    Personal history of other drug therapy 12/03/2019    History of influenza vaccination    Personal history of other medical treatment 11/17/2022    History of screening mammography    Personal history of other specified conditions 11/04/2022    History of chest pain    Pityriasis rosea 09/19/2022    Secondary polycythemia 08/15/2022    Erythrocytosis    Secondary polycythemia 08/15/2022    Erythrocytosis    Secondary polycythemia 08/15/2022    Erythrocytosis    Varicella ?      Past Surgical History:   Procedure Laterality Date    GANGLION CYST EXCISION      OTHER SURGICAL HISTORY  10/21/2019    Cervical loop electrosurgical excision twice    WISDOM TOOTH EXTRACTION        Allergies   Allergen Reactions    Acetaminophen Hives and Unknown     Equate brand    Allopurinol Swelling and Other     gum swelling    Dr. Barrientos's brand   Mouth dry   Lips peel    Amlodipine Swelling, Other and Unknown     Gum swelling    [facility] brand    Atorvastatin Other     Lupin Brand - REACTION: tongue goes numb, mouth soreness, dried lips, bleeding    Latanoprost Swelling and Unknown      Current Outpatient Medications on File Prior to Visit   Medication Sig Dispense Refill    allopurinol (Zyloprim) 100 mg tablet Take 1 tablet (100 mg) by mouth once daily. 90 tablet 1    amLODIPine (Norvasc) 10 mg tablet Take 1 tablet (10 mg) by mouth once daily. 90 tablet 1    aspirin 81 mg EC tablet Take 1 tablet (81 mg) by mouth once daily.      atorvastatin (Lipitor) 40 mg tablet Take 1 tablet (40 mg) by mouth once daily. 90 tablet 1    cyanocobalamin (Vitamin B-12) 500 mcg tablet Take 1 tablet (500 mcg) by mouth once daily.       dorzolamide-timoloL (Cosopt) 22.3-6.8 mg/mL ophthalmic solution every 12 hours.      ezetimibe (Zetia) 10 mg tablet Take 1 tablet (10 mg) by mouth once daily. 90 tablet 3    folic acid (Folvite) 400 mcg tablet Take 1 tablet (0.4 mg) by mouth once daily.      hydrOXYzine pamoate (Vistaril) 25 mg capsule Take 1 capsule (25 mg) by mouth 3 times a day as needed for anxiety.      lisinopril 40 mg tablet Take 1 tablet (40 mg) by mouth once daily.      olopatadine (Patanol) 0.1 % ophthalmic solution Administer 1 drop into both eyes every 12 hours.      Travatan Z 0.004 % drops ophthalmic solution once every 24 hours.       No current facility-administered medications on file prior to visit.        Objective   Physical Exam  Constitutional:       Appearance: Normal appearance.   Neck:      Thyroid: No thyromegaly.   Cardiovascular:      Rate and Rhythm: Normal rate and regular rhythm.      Heart sounds: Normal heart sounds.   Pulmonary:      Effort: Pulmonary effort is normal.      Breath sounds: Normal breath sounds.   Chest:      Chest wall: No mass.   Breasts:     Right: Normal. No inverted nipple, mass, nipple discharge or skin change.      Left: Normal. No inverted nipple, mass, nipple discharge or skin change.   Abdominal:      General: There is no distension.      Palpations: Abdomen is soft. There is no mass.      Tenderness: There is no abdominal tenderness.   Genitourinary:     General: Normal vulva.      Exam position: Lithotomy position.      Labia:         Right: No rash.         Left: No rash.       Vagina: Normal. No lesions.      Cervix: No friability or lesion.      Uterus: Normal. Not enlarged and not tender.       Adnexa: Right adnexa normal and left adnexa normal.        Right: No mass or tenderness.          Left: No mass or tenderness.        Comments: Atrophy is noted.   Cervix is shortened.   Mild cystocele and pelvic prolapse is noted.   Musculoskeletal:         General: No deformity.      Cervical  back: Neck supple.   Lymphadenopathy:      Cervical: No cervical adenopathy.   Skin:     General: Skin is warm and dry.      Findings: No rash.   Neurological:      General: No focal deficit present.      Mental Status: She is alert.   Psychiatric:         Mood and Affect: Mood normal.         Behavior: Behavior is cooperative.         Thought Content: Thought content normal.           Problem List Items Addressed This Visit       Encounter for well woman exam with abnormal findings - Primary    Overview     Pap and HPV were negative 10/29/2020.   She is s/p LEEP in 2016 for HPV 16 positive pap and inadequate colposcopy with final pathology without dysplasia but with HPV changes. Cervical cancer screening will be needed until 2036.   DEXA 1/16/2023 returned with normal bone density.          Current Assessment & Plan     Pap was sent with HPV.  Mammogram is due in November 2024.   Regular exercise and attaining/maintaining a healthy weight is encouraged.   Adequate calcium intake with diet or supplements is encouraged.    We will notify of any abnormal results.          Human papillomavirus (HPV) type 16 DNA detected in cervical specimen    Overview     She is s/p LEEP in 2016 for HPV 16 positive pap and inadequate colposcopy with final pathology without dysplasia but with HPV changes. Cervical cancer screening will be needed until 2036.

## 2024-03-19 NOTE — ASSESSMENT & PLAN NOTE
Pap was sent with HPV.  Mammogram is due in November 2024.   Regular exercise and attaining/maintaining a healthy weight is encouraged.   Adequate calcium intake with diet or supplements is encouraged.    We will notify of any abnormal results.

## 2024-03-21 ENCOUNTER — OFFICE VISIT (OUTPATIENT)
Dept: OBSTETRICS AND GYNECOLOGY | Facility: CLINIC | Age: 74
End: 2024-03-21
Payer: MEDICARE

## 2024-03-21 VITALS
DIASTOLIC BLOOD PRESSURE: 70 MMHG | HEIGHT: 59 IN | WEIGHT: 136 LBS | BODY MASS INDEX: 27.42 KG/M2 | SYSTOLIC BLOOD PRESSURE: 110 MMHG

## 2024-03-21 DIAGNOSIS — Z01.411 ENCOUNTER FOR WELL WOMAN EXAM WITH ABNORMAL FINDINGS: Primary | ICD-10-CM

## 2024-03-21 DIAGNOSIS — R87.810 HUMAN PAPILLOMAVIRUS (HPV) TYPE 16 DNA DETECTED IN CERVICAL SPECIMEN: ICD-10-CM

## 2024-03-21 PROCEDURE — 88175 CYTOPATH C/V AUTO FLUID REDO: CPT

## 2024-03-21 PROCEDURE — 1036F TOBACCO NON-USER: CPT | Performed by: OBSTETRICS & GYNECOLOGY

## 2024-03-21 PROCEDURE — 99397 PER PM REEVAL EST PAT 65+ YR: CPT | Performed by: OBSTETRICS & GYNECOLOGY

## 2024-03-21 PROCEDURE — 3078F DIAST BP <80 MM HG: CPT | Performed by: OBSTETRICS & GYNECOLOGY

## 2024-03-21 PROCEDURE — 1159F MED LIST DOCD IN RCRD: CPT | Performed by: OBSTETRICS & GYNECOLOGY

## 2024-03-21 PROCEDURE — 3008F BODY MASS INDEX DOCD: CPT | Performed by: OBSTETRICS & GYNECOLOGY

## 2024-03-21 PROCEDURE — 3074F SYST BP LT 130 MM HG: CPT | Performed by: OBSTETRICS & GYNECOLOGY

## 2024-03-21 PROCEDURE — 87624 HPV HI-RISK TYP POOLED RSLT: CPT

## 2024-03-21 PROCEDURE — 1160F RVW MEDS BY RX/DR IN RCRD: CPT | Performed by: OBSTETRICS & GYNECOLOGY

## 2024-03-21 ASSESSMENT — ENCOUNTER SYMPTOMS
JOINT SWELLING: 0
HEMATURIA: 0
ACTIVITY CHANGE: 0
DIZZINESS: 0
DIARRHEA: 0
AGITATION: 0
APNEA: 0
COUGH: 0
CONSTIPATION: 0

## 2024-04-02 LAB
CYTOLOGY CMNT CVX/VAG CYTO-IMP: NORMAL
HPV HR 12 DNA GENITAL QL NAA+PROBE: NEGATIVE
HPV HR GENOTYPES PNL CVX NAA+PROBE: NEGATIVE
HPV16 DNA SPEC QL NAA+PROBE: NEGATIVE
HPV18 DNA SPEC QL NAA+PROBE: NEGATIVE
LAB AP HPV GENOTYPE QUESTION: YES
LAB AP HPV HR: NORMAL
LABORATORY COMMENT REPORT: NORMAL
PATH REPORT.TOTAL CANCER: NORMAL

## 2024-05-30 ENCOUNTER — LAB (OUTPATIENT)
Dept: LAB | Facility: LAB | Age: 74
End: 2024-05-30
Payer: MEDICARE

## 2024-05-30 DIAGNOSIS — I25.10 CORONARY ARTERY CALCIFICATION: ICD-10-CM

## 2024-05-30 DIAGNOSIS — E78.2 MIXED HYPERLIPIDEMIA: ICD-10-CM

## 2024-05-30 DIAGNOSIS — I65.23 BILATERAL CAROTID ARTERY STENOSIS: ICD-10-CM

## 2024-05-30 DIAGNOSIS — R73.03 PREDIABETES: ICD-10-CM

## 2024-05-30 DIAGNOSIS — I10 ESSENTIAL HYPERTENSION: ICD-10-CM

## 2024-05-30 DIAGNOSIS — Z11.59 NEED FOR HEPATITIS C SCREENING TEST: ICD-10-CM

## 2024-05-30 DIAGNOSIS — I25.84 CORONARY ARTERY CALCIFICATION: ICD-10-CM

## 2024-05-30 LAB
ALBUMIN SERPL BCP-MCNC: 4.5 G/DL (ref 3.4–5)
ALP SERPL-CCNC: 81 U/L (ref 33–136)
ALT SERPL W P-5'-P-CCNC: 31 U/L (ref 7–45)
ANION GAP SERPL CALC-SCNC: 14 MMOL/L (ref 10–20)
AST SERPL W P-5'-P-CCNC: 28 U/L (ref 9–39)
BILIRUB SERPL-MCNC: 0.6 MG/DL (ref 0–1.2)
BUN SERPL-MCNC: 19 MG/DL (ref 6–23)
CALCIUM SERPL-MCNC: 10 MG/DL (ref 8.6–10.3)
CHLORIDE SERPL-SCNC: 104 MMOL/L (ref 98–107)
CHOLEST SERPL-MCNC: 174 MG/DL (ref 0–199)
CHOLESTEROL/HDL RATIO: 2.5
CO2 SERPL-SCNC: 27 MMOL/L (ref 21–32)
CREAT SERPL-MCNC: 1.01 MG/DL (ref 0.5–1.05)
EGFRCR SERPLBLD CKD-EPI 2021: 59 ML/MIN/1.73M*2
ERYTHROCYTE [DISTWIDTH] IN BLOOD BY AUTOMATED COUNT: 13.2 % (ref 11.5–14.5)
EST. AVERAGE GLUCOSE BLD GHB EST-MCNC: 117 MG/DL
GLUCOSE SERPL-MCNC: 89 MG/DL (ref 74–99)
HBA1C MFR BLD: 5.7 %
HCT VFR BLD AUTO: 43.5 % (ref 36–46)
HCV AB SER QL: NONREACTIVE
HDLC SERPL-MCNC: 69.7 MG/DL
HGB BLD-MCNC: 14.4 G/DL (ref 12–16)
LDLC SERPL CALC-MCNC: 79 MG/DL
MAGNESIUM SERPL-MCNC: 2.24 MG/DL (ref 1.6–2.4)
MCH RBC QN AUTO: 29.8 PG (ref 26–34)
MCHC RBC AUTO-ENTMCNC: 33.1 G/DL (ref 32–36)
MCV RBC AUTO: 90 FL (ref 80–100)
NON HDL CHOLESTEROL: 104 MG/DL (ref 0–149)
NRBC BLD-RTO: 0 /100 WBCS (ref 0–0)
PLATELET # BLD AUTO: 203 X10*3/UL (ref 150–450)
POTASSIUM SERPL-SCNC: 4.3 MMOL/L (ref 3.5–5.3)
PROT SERPL-MCNC: 7.3 G/DL (ref 6.4–8.2)
RBC # BLD AUTO: 4.83 X10*6/UL (ref 4–5.2)
SODIUM SERPL-SCNC: 141 MMOL/L (ref 136–145)
TRIGL SERPL-MCNC: 127 MG/DL (ref 0–149)
VLDL: 25 MG/DL (ref 0–40)
WBC # BLD AUTO: 6.1 X10*3/UL (ref 4.4–11.3)

## 2024-05-30 PROCEDURE — 86803 HEPATITIS C AB TEST: CPT

## 2024-05-30 PROCEDURE — 83735 ASSAY OF MAGNESIUM: CPT

## 2024-05-30 PROCEDURE — 36415 COLL VENOUS BLD VENIPUNCTURE: CPT

## 2024-05-30 PROCEDURE — 80053 COMPREHEN METABOLIC PANEL: CPT

## 2024-05-30 PROCEDURE — 80061 LIPID PANEL: CPT

## 2024-05-30 PROCEDURE — 83036 HEMOGLOBIN GLYCOSYLATED A1C: CPT

## 2024-05-30 PROCEDURE — 85027 COMPLETE CBC AUTOMATED: CPT

## 2024-05-31 ENCOUNTER — TELEPHONE (OUTPATIENT)
Dept: CARDIOLOGY | Facility: CLINIC | Age: 74
End: 2024-05-31
Payer: MEDICARE

## 2024-05-31 DIAGNOSIS — E78.2 MIXED HYPERLIPIDEMIA: Primary | ICD-10-CM

## 2024-05-31 NOTE — TELEPHONE ENCOUNTER
6/3/24  1104  Called results to patient and plan for medication change from atorvastatin to rosuvastatin.   Patient verbalized understanding of results and is agreeable to plan.      Atorvastatin discontinued and rosuvastatin sent for approval.       5/31/24  1149  Called patient; no answer. Left voice message for patient to return call for results and directives from Dr. Farrell        ----- Message from Jorge Farrell DO sent at 5/30/2024  1:53 PM EDT -----  Please call and inform the patient that blood work shows suboptimal control of her cholesterol with an LDL cholesterol of 79 and triglycerides of 127.  I would like for her to discontinue atorvastatin and start rosuvastatin 40 mg daily.  Patient should continue Zetia 10 mg daily.

## 2024-06-03 RX ORDER — ROSUVASTATIN CALCIUM 40 MG/1
40 TABLET, COATED ORAL DAILY
Qty: 90 TABLET | Refills: 1 | Status: SHIPPED | OUTPATIENT
Start: 2024-06-03 | End: 2025-06-03

## 2024-06-13 ENCOUNTER — APPOINTMENT (OUTPATIENT)
Dept: PRIMARY CARE | Facility: CLINIC | Age: 74
End: 2024-06-13
Payer: MEDICARE

## 2024-06-20 ENCOUNTER — APPOINTMENT (OUTPATIENT)
Dept: PRIMARY CARE | Facility: CLINIC | Age: 74
End: 2024-06-20
Payer: MEDICARE

## 2024-06-20 VITALS
DIASTOLIC BLOOD PRESSURE: 80 MMHG | HEART RATE: 79 BPM | OXYGEN SATURATION: 99 % | BODY MASS INDEX: 27.42 KG/M2 | TEMPERATURE: 97.7 F | HEIGHT: 59 IN | WEIGHT: 136 LBS | SYSTOLIC BLOOD PRESSURE: 122 MMHG

## 2024-06-20 DIAGNOSIS — I77.9 CAROTID ARTERY DISEASE, UNSPECIFIED LATERALITY, UNSPECIFIED TYPE (CMS-HCC): ICD-10-CM

## 2024-06-20 DIAGNOSIS — M54.6 THORACIC BACK PAIN, UNSPECIFIED BACK PAIN LATERALITY, UNSPECIFIED CHRONICITY: ICD-10-CM

## 2024-06-20 DIAGNOSIS — Z00.00 ROUTINE GENERAL MEDICAL EXAMINATION AT HEALTH CARE FACILITY: ICD-10-CM

## 2024-06-20 DIAGNOSIS — E66.3 OVERWEIGHT WITH BODY MASS INDEX (BMI) OF 27 TO 27.9 IN ADULT: ICD-10-CM

## 2024-06-20 DIAGNOSIS — I10 ESSENTIAL HYPERTENSION: Primary | ICD-10-CM

## 2024-06-20 DIAGNOSIS — R73.03 PREDIABETES: ICD-10-CM

## 2024-06-20 DIAGNOSIS — M54.50 LUMBAR PAIN: ICD-10-CM

## 2024-06-20 DIAGNOSIS — E78.2 MIXED HYPERLIPIDEMIA: ICD-10-CM

## 2024-06-20 DIAGNOSIS — N18.31 STAGE 3A CHRONIC KIDNEY DISEASE (MULTI): ICD-10-CM

## 2024-06-20 DIAGNOSIS — Z78.9 FULL CODE STATUS: ICD-10-CM

## 2024-06-20 PROCEDURE — G0439 PPPS, SUBSEQ VISIT: HCPCS | Performed by: FAMILY MEDICINE

## 2024-06-20 PROCEDURE — 3074F SYST BP LT 130 MM HG: CPT | Performed by: FAMILY MEDICINE

## 2024-06-20 PROCEDURE — 1160F RVW MEDS BY RX/DR IN RCRD: CPT | Performed by: FAMILY MEDICINE

## 2024-06-20 PROCEDURE — 1159F MED LIST DOCD IN RCRD: CPT | Performed by: FAMILY MEDICINE

## 2024-06-20 PROCEDURE — 3079F DIAST BP 80-89 MM HG: CPT | Performed by: FAMILY MEDICINE

## 2024-06-20 PROCEDURE — 1124F ACP DISCUSS-NO DSCNMKR DOCD: CPT | Performed by: FAMILY MEDICINE

## 2024-06-20 PROCEDURE — 1170F FXNL STATUS ASSESSED: CPT | Performed by: FAMILY MEDICINE

## 2024-06-20 PROCEDURE — 99214 OFFICE O/P EST MOD 30 MIN: CPT | Performed by: FAMILY MEDICINE

## 2024-06-20 PROCEDURE — 3008F BODY MASS INDEX DOCD: CPT | Performed by: FAMILY MEDICINE

## 2024-06-20 PROCEDURE — 1036F TOBACCO NON-USER: CPT | Performed by: FAMILY MEDICINE

## 2024-06-20 PROCEDURE — 99397 PER PM REEVAL EST PAT 65+ YR: CPT | Performed by: FAMILY MEDICINE

## 2024-06-20 PROCEDURE — G0446 INTENS BEHAVE THER CARDIO DX: HCPCS | Performed by: FAMILY MEDICINE

## 2024-06-20 RX ORDER — AMLODIPINE BESYLATE 10 MG/1
10 TABLET ORAL DAILY
Qty: 90 TABLET | Refills: 1 | Status: SHIPPED | OUTPATIENT
Start: 2024-06-20 | End: 2024-12-17

## 2024-06-20 ASSESSMENT — ENCOUNTER SYMPTOMS
NAUSEA: 0
POLYPHAGIA: 0
DIFFICULTY URINATING: 0
VOMITING: 0
BLOOD IN STOOL: 0
SLEEP DISTURBANCE: 0
DYSPHORIC MOOD: 0
POLYDIPSIA: 0
ABDOMINAL PAIN: 0
ABDOMINAL DISTENTION: 0
MYALGIAS: 0
DIZZINESS: 0
HEADACHES: 0
FATIGUE: 0
PALPITATIONS: 0
CONSTIPATION: 0
SHORTNESS OF BREATH: 0
DYSURIA: 0
DIARRHEA: 0

## 2024-06-20 ASSESSMENT — ACTIVITIES OF DAILY LIVING (ADL)
TAKING_MEDICATION: INDEPENDENT
GROCERY_SHOPPING: INDEPENDENT
DRESSING: INDEPENDENT
BATHING: INDEPENDENT
MANAGING_FINANCES: INDEPENDENT
DOING_HOUSEWORK: INDEPENDENT

## 2024-06-20 NOTE — PROGRESS NOTES
"Subjective   Reason for Visit: Jayshree Garcia is an 74 y.o. female here for a Medicare Wellness visit, CPE and multiple issues.     Past Medical, Surgical, and Family History reviewed and updated in chart.    Reviewed all medications by prescribing practitioner or clinical pharmacist (such as prescriptions, OTCs, herbal therapies and supplements) and documented in the medical record.    HPI    Patient Care Team:  Earline Molina DO as PCP - General (Family Medicine)  Shalonda Multani MD as PCP - [employer] PCP   Dr. Hernadez-Nephrology  Dr. Farrell-cardiology  Dr. Main-ophthal      CKD: stable and mproving. Cr 1.01, GFR 59  Lipids: controlled. HDL 69, LDL 79,   HTN: controlled  Predm: stable. A1c 5.7(5.8)  CAD: stable. Seeing cardiology  BMI: min high. Working on diet.    Pain: rt shoulder pain, thoracic and lower back.     Review of Systems   Constitutional:  Negative for fatigue.   HENT: Negative.     Eyes:  Negative for visual disturbance.   Respiratory:  Negative for shortness of breath.    Cardiovascular:  Negative for chest pain and palpitations.   Gastrointestinal:  Negative for abdominal distention, abdominal pain, blood in stool, constipation, diarrhea, nausea and vomiting.   Endocrine: Negative for cold intolerance, heat intolerance, polydipsia, polyphagia and polyuria.   Genitourinary:  Negative for difficulty urinating and dysuria.   Musculoskeletal:  Negative for myalgias.   Skin:  Negative for rash.   Allergic/Immunologic: Positive for environmental allergies (spring).   Neurological:  Negative for dizziness and headaches.   Psychiatric/Behavioral:  Negative for dysphoric mood and sleep disturbance.        Objective   Vitals:  /80   Pulse 79   Temp 36.5 °C (97.7 °F)   Ht 1.499 m (4' 11\")   Wt 61.7 kg (136 lb)   SpO2 99%   BMI 27.47 kg/m²       Physical Exam  Vitals and nursing note reviewed.   Constitutional:       General: She is not in acute distress.     " Appearance: Normal appearance. She is not toxic-appearing.   HENT:      Head: Normocephalic.      Right Ear: Tympanic membrane normal.      Left Ear: Tympanic membrane normal.      Nose: Nose normal.      Mouth/Throat:      Pharynx: Oropharynx is clear.   Eyes:      General: No scleral icterus.     Pupils: Pupils are equal, round, and reactive to light.   Neck:      Vascular: No carotid bruit.   Cardiovascular:      Rate and Rhythm: Normal rate and regular rhythm.      Heart sounds: No murmur heard.  Pulmonary:      Effort: Pulmonary effort is normal. No respiratory distress.      Breath sounds: Normal breath sounds.   Abdominal:      General: Bowel sounds are normal.      Palpations: Abdomen is soft.      Tenderness: There is no abdominal tenderness. There is no guarding.   Musculoskeletal:         General: No tenderness.      Cervical back: No tenderness.      Right lower leg: No edema.      Left lower leg: No edema.      Comments: T and L spine NTTP.   Lymphadenopathy:      Cervical: No cervical adenopathy.   Skin:     General: Skin is warm.   Neurological:      General: No focal deficit present.      Mental Status: She is alert.      Cranial Nerves: No cranial nerve deficit.   Psychiatric:         Mood and Affect: Mood normal.         Assessment/Plan   Problem List Items Addressed This Visit       Carotid artery disease (CMS-HCC)    Essential hypertension - Primary    Relevant Medications    amLODIPine (Norvasc) 10 mg tablet    Other Relevant Orders    Comprehensive Metabolic Panel    Mixed hyperlipidemia    Relevant Orders    Comprehensive Metabolic Panel    Prediabetes    Relevant Orders    Comprehensive Metabolic Panel    Hemoglobin A1C    Stage 3a chronic kidney disease (Multi)    Relevant Orders    Comprehensive Metabolic Panel    Overweight with body mass index (BMI) of 27 to 27.9 in adult     Other Visit Diagnoses       Routine general medical examination at health care facility        Full code status         Relevant Orders    Full code (Completed)    Thoracic back pain, unspecified back pain laterality, unspecified chronicity        Relevant Orders    XR thoracic spine 3 views    Lumbar pain        Relevant Orders    XR lumbar spine 2-3 views        Discussed blood work and wellness issues. Reviewed screenings and immunizations. Recommendations given      ASCVD risk counseling:  discussed ASCVD risk and score 13.4%.  Aspirin not indicated at this time. Lifestyle modifications including nutritional choices, exercise and trying to eliminate habits contributing to risk were discussed. Patient agreeable to make efforts to continue to control their current cardiovascular risk factors. Recommendations given

## 2024-06-20 NOTE — PATIENT INSTRUCTIONS
Recommend a predominant low fat whole foods plant based diet.  Cut back on meat, dairy, processed carbs, salt and oils(especially palm and coconut). Increase fiber in your diet.  Decrease alcohol as much as possible if you drink. Recommend regular exercise most days of the week(goal up to 150min per week). Also recommend good sleep habits aiming for 7-8 hours per night.     Please bring in copies of your advance directives to your next appointment     Continue your current meds    Complete you shingles vaccine at the pharmacy    You were referred for xrays    Follow up with your specialists as scheduled    Return in 6 months, sooner if needed

## 2024-07-18 ENCOUNTER — HOSPITAL ENCOUNTER (OUTPATIENT)
Dept: RADIOLOGY | Facility: CLINIC | Age: 74
Discharge: HOME | End: 2024-07-18
Payer: MEDICARE

## 2024-07-18 DIAGNOSIS — M54.50 LUMBAR PAIN: ICD-10-CM

## 2024-07-18 DIAGNOSIS — M54.6 THORACIC BACK PAIN, UNSPECIFIED BACK PAIN LATERALITY, UNSPECIFIED CHRONICITY: ICD-10-CM

## 2024-07-18 PROCEDURE — 72100 X-RAY EXAM L-S SPINE 2/3 VWS: CPT

## 2024-07-18 PROCEDURE — 72100 X-RAY EXAM L-S SPINE 2/3 VWS: CPT | Performed by: RADIOLOGY

## 2024-07-18 PROCEDURE — 72072 X-RAY EXAM THORAC SPINE 3VWS: CPT

## 2024-07-18 PROCEDURE — 72072 X-RAY EXAM THORAC SPINE 3VWS: CPT | Performed by: RADIOLOGY

## 2024-07-26 ENCOUNTER — TELEPHONE (OUTPATIENT)
Dept: PRIMARY CARE | Facility: CLINIC | Age: 74
End: 2024-07-26
Payer: MEDICARE

## 2024-07-26 NOTE — TELEPHONE ENCOUNTER
Pt called back and is wanting to try pain management. Please call pt once order is placed so she can call central scheduling.

## 2024-08-06 DIAGNOSIS — M54.6 THORACIC BACK PAIN, UNSPECIFIED BACK PAIN LATERALITY, UNSPECIFIED CHRONICITY: Primary | ICD-10-CM

## 2024-08-06 DIAGNOSIS — M54.50 LUMBAR PAIN: ICD-10-CM

## 2024-09-11 ENCOUNTER — APPOINTMENT (OUTPATIENT)
Dept: PAIN MEDICINE | Facility: HOSPITAL | Age: 74
End: 2024-09-11
Payer: MEDICARE

## 2024-10-14 ENCOUNTER — HOSPITAL ENCOUNTER (OUTPATIENT)
Dept: RADIOLOGY | Facility: HOSPITAL | Age: 74
Discharge: HOME | End: 2024-10-14
Payer: MEDICARE

## 2024-10-14 ENCOUNTER — OFFICE VISIT (OUTPATIENT)
Dept: PAIN MEDICINE | Facility: HOSPITAL | Age: 74
End: 2024-10-14
Payer: MEDICARE

## 2024-10-14 VITALS
DIASTOLIC BLOOD PRESSURE: 83 MMHG | HEIGHT: 59 IN | SYSTOLIC BLOOD PRESSURE: 148 MMHG | OXYGEN SATURATION: 100 % | WEIGHT: 128 LBS | RESPIRATION RATE: 16 BRPM | BODY MASS INDEX: 25.8 KG/M2 | HEART RATE: 74 BPM

## 2024-10-14 DIAGNOSIS — M54.2 NECK PAIN: ICD-10-CM

## 2024-10-14 DIAGNOSIS — M54.50 LUMBAR PAIN: Primary | ICD-10-CM

## 2024-10-14 DIAGNOSIS — M54.6 THORACIC BACK PAIN, UNSPECIFIED BACK PAIN LATERALITY, UNSPECIFIED CHRONICITY: ICD-10-CM

## 2024-10-14 PROCEDURE — 72040 X-RAY EXAM NECK SPINE 2-3 VW: CPT | Performed by: RADIOLOGY

## 2024-10-14 PROCEDURE — 99214 OFFICE O/P EST MOD 30 MIN: CPT | Performed by: PHYSICAL MEDICINE & REHABILITATION

## 2024-10-14 PROCEDURE — 72040 X-RAY EXAM NECK SPINE 2-3 VW: CPT

## 2024-10-14 RX ORDER — DULOXETIN HYDROCHLORIDE 30 MG/1
30 CAPSULE, DELAYED RELEASE ORAL DAILY
Qty: 30 CAPSULE | Refills: 2 | Status: SHIPPED | OUTPATIENT
Start: 2024-10-14

## 2024-10-14 RX ORDER — DULOXETIN HYDROCHLORIDE 30 MG/1
30 CAPSULE, DELAYED RELEASE ORAL DAILY
Qty: 30 CAPSULE | Refills: 2 | Status: SHIPPED | OUTPATIENT
Start: 2024-10-14 | End: 2024-10-14

## 2024-10-14 ASSESSMENT — ENCOUNTER SYMPTOMS
CHILLS: 0
FLANK PAIN: 0
HEADACHES: 0
DEPRESSION: 0
LOSS OF SENSATION IN FEET: 0
BOWEL INCONTINENCE: 0
PERIANAL NUMBNESS: 0
WEAKNESS: 0
PHOTOPHOBIA: 0
FEVER: 0
TINGLING: 0
UNEXPECTED WEIGHT CHANGE: 0
OCCASIONAL FEELINGS OF UNSTEADINESS: 0
BACK PAIN: 1
NUMBNESS: 0
NECK PAIN: 1

## 2024-10-14 ASSESSMENT — COLUMBIA-SUICIDE SEVERITY RATING SCALE - C-SSRS
2. HAVE YOU ACTUALLY HAD ANY THOUGHTS OF KILLING YOURSELF?: NO
1. IN THE PAST MONTH, HAVE YOU WISHED YOU WERE DEAD OR WISHED YOU COULD GO TO SLEEP AND NOT WAKE UP?: NO
6. HAVE YOU EVER DONE ANYTHING, STARTED TO DO ANYTHING, OR PREPARED TO DO ANYTHING TO END YOUR LIFE?: NO

## 2024-10-14 ASSESSMENT — PATIENT HEALTH QUESTIONNAIRE - PHQ9
2. FEELING DOWN, DEPRESSED OR HOPELESS: NOT AT ALL
1. LITTLE INTEREST OR PLEASURE IN DOING THINGS: NOT AT ALL
SUM OF ALL RESPONSES TO PHQ9 QUESTIONS 1 AND 2: 0
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
## 2024-10-24 DIAGNOSIS — E78.2 MIXED HYPERLIPIDEMIA: ICD-10-CM

## 2024-10-24 NOTE — TELEPHONE ENCOUNTER
----- Message from Nurse Vale LOUISE sent at 10/24/2024  1:55 PM EDT -----  Regarding: Refill  Patient is requesting a refill of rosuvastatin 40 mg to be sent to [facility]. Call patient at [phone] if needed

## 2024-10-25 RX ORDER — ROSUVASTATIN CALCIUM 40 MG/1
40 TABLET, COATED ORAL DAILY
Qty: 90 TABLET | Refills: 1 | Status: SHIPPED | OUTPATIENT
Start: 2024-10-25 | End: 2025-10-25

## 2024-11-03 NOTE — PROGRESS NOTES
[facility]    Patient Name: Jayshree Garcia  Patient : 1950      Scribe Attestation  By signing my name below, IJackie Scribe   attest that this documentation has been prepared under the direction and in the presence of Jorge Farrell DO.      Reason for visit:  This is a 74-year-old female here for follow-up regarding coronary artery calcification, hypertension, dyslipidemia, mild carotid artery disease, and lower extremity edema.     HPI:  This is a 74-year-old female here for follow-up regarding coronary artery calcification, hypertension, dyslipidemia, mild carotid artery disease, and lower extremity edema.  The patient was last evaluated by me in 2023.  At that visit I ordered blood work including CMP/lipid/magnesium/CBC to be drawn in 6 months and asked the patient to follow-up in 1 year.  CMP done in May showed normal serum sodium and potassium with a serum creatinine of 1.01, normal ALT/AST, serum magnesium was 2.24, hemoglobin A1c was 5.7%, CBC showed a hemoglobin of 14.4.  Lipid panel done in May 2024 showed an LDL cholesterol of 79 and triglycerides of 127 now on rosuvastatin 40 mg daily and Zetia 10 mg daily. ECG done today showed sinus rhythm with a heart rate of 71 bpm.  The patient reports that she has been feeling generally well from the cardiac standpoint. She denies any new chest pain, shortness of breath, palpitations and lightheadedness.  She does report some mild lower extremity edema.  She states that she had been watching her weight and eating healthier. She states that she takes all of her medications as prescribed. During my exam, she was resting comfortably on the exam table.          Assessment/Plan:   1. Coronary artery calcification  The patient has a history of coronary artery calcification with a total score of 1023.25 placing the patient in a high risk group.   ECG done today showed sinus rhythm with a heart  rate of 71 bpm.    She denies anginal chest discomfort.  Blood pressure appears controlled on exam today.   She should continue current antihypertensive medications and antiplatelet therapy.  Echocardiogram done on 10/27/2022 showed normal left ventricular systolic function with an ejection fraction of 60-65%, normal right ventricular systolic function, and no significant valve abnormalities.  Recent lab works as noted in the HPI.  Lipid panel done in May 2024 showed an LDL cholesterol of 79 and triglycerides of 127 now on rosuvastatin 40 mg daily and Zetia 10 mg daily.   Lab works were ordered as noted below.   Please see lifestyle recommendations below.  Follow up in 1 year and sooner if necessary.      2. Hypertension  The patient has a history of hypertension and blood pressure appears controlled on exam today.   She should continue her current antihypertensive medications and monitor her blood pressure at home.      3. Dyslipidemia  Lipid panel done in May 2024 showed an LDL cholesterol of 79 and triglycerides of 127 now on rosuvastatin 40 mg daily and Zetia 10 mg daily.   Lipid panel was ordered as noted below.  Please see lifestyle recommendations below.      4. Carotid artery disease  Carotid artery duplex ultrasound done on 10/27/22 showed findings consistent with less than 50% stenosis of the right and left proximal ICA.   I will continue to monitor clinically for now.  Continue risk factor modification.     5. Lower extremity edema  The patient has 1+ pitting bilateral lower extremity edema that do not bother her.  I discussed with her the importance of following a low-sodium heart healthy diet, wearing compression stockings and elevating legs when seated.     Orders:   CMP/lipid panel,   Follow-up in 1 year.      Lifestyle Recommendations  I recommend a whole-food plant-based diet, an eating pattern that encourages the consumption of unrefined plant foods (such as fruits, vegetables, tubers, whole  grains, legumes, nuts and seeds) and discourages meats, dairy products, eggs and processed foods.     The AHA/ACC recommends that the patient consume a dietary pattern that emphasizes intake of vegetables, fruits, and whole grains; includes low-fat dairy products, poultry, fish, legumes, non-tropical vegetable oils, and nuts; and limits intake of sodium, sweets, sugar-sweetened beverages, and red meats.  Adapt this dietary pattern to appropriate calorie requirements (a 500-750 kcal/day deficit to loose weight), personal and cultural food preferences, and nutrition therapy for other medical conditions (including diabetes).  Achieve this pattern by following plans such as the [place] Mediterranean, DASH dietary pattern, or AHA diet.     Engage in 2 hours and 30 minutes per week of moderate-intensity physical activity, or 1 hour and 15 minutes (75 minutes) per week of vigorous-intensity aerobic physical activity, or an equivalent combination of moderate and vigorous-intensity aerobic physical activity. Aerobic activity should be performed in episodes of at least 10 minutes preferably spread throughout the week.     Adhering to a heart healthy diet, regular exercise habits, avoidance of tobacco products, and maintenance of a healthy weight are crucial components of their heart disease risk reduction.     Any positive review of systems not specifically addressed in the office visit today should be evaluated and treated by the patients primary care physician or in an emergency department if necessary     Patient was notified that results from ordered tests will be called to the patient if it changes current management; it will otherwise be discussed at a future appointment and available on  [employer].     Thank you for allowing me to participate in the care of this patient.        This document was generated using the assistance of voice recognition software. If there are any errors of spelling, grammar, syntax, or meaning;  please feel free to contact me directly for clarification.    Past Medical History:  She has a past medical history of Acute atopic conjunctivitis, bilateral (06/09/2020), Acute vaginitis (11/09/2020), Allergic, Cervical high risk human papillomavirus (HPV) DNA test positive, Chronic kidney disease (2023), Encounter for other screening for malignant neoplasm of breast (10/21/2019), Erythrocytosis (06/15/2023), Glaucoma (1998), Hypertension (1998), Mixed hyperlipidemia (10/06/2022), Mixed hyperlipidemia (10/06/2022), Mixed hyperlipidemia (10/06/2022), Mixed hyperlipidemia (10/06/2022), Other abnormal and inconclusive findings on diagnostic imaging of breast (11/04/2019), Other seasonal allergic rhinitis (06/09/2020), Personal history of diseases of the skin and subcutaneous tissue (09/19/2022), Personal history of other diseases of the digestive system (09/25/2019), Personal history of other drug therapy (12/03/2019), Personal history of other medical treatment (11/17/2022), Personal history of other specified conditions (11/04/2022), Pityriasis rosea (09/19/2022), Secondary polycythemia (08/15/2022), Secondary polycythemia (08/15/2022), Secondary polycythemia (08/15/2022), and Varicella (?).    Past Surgical History:  She has a past surgical history that includes Other surgical history (10/21/2019); Ganglion cyst excision; and [place] tooth extraction.      Social History:  She reports that she quit smoking about 3 years ago. Her smoking use included cigarettes. She has never used smokeless tobacco. She reports current alcohol use of about 2.0 - 3.0 standard drinks of alcohol per week. She reports that she does not use drugs.    Family History:  Family History   Problem Relation Name Age of Onset    Rheumatic fever Mother      Coronary artery disease Mother      Coronary artery disease Father      Hypertension Father      Hypertension Sister      Hyperlipidemia Brother      Hypertension Brother      Coronary artery  "disease Brother  60 - 69    Coronary artery disease Brother  60 - 69    Diabetes Neg Hx      Cancer Neg Hx          Allergies:  Acetaminophen, Allopurinol, Amlodipine, Atorvastatin, and Latanoprost    Outpatient Medications:  Current Outpatient Medications   Medication Instructions    amLODIPine (NORVASC) 10 mg, oral, Daily    aspirin 81 mg EC tablet 1 tablet, oral, Daily    cyanocobalamin (VITAMIN B-12) 500 mcg, oral, Daily RT    dorzolamide-timoloL (Cosopt) 22.3-6.8 mg/mL ophthalmic solution Every 12 hours    DULoxetine (CYMBALTA) 30 mg, oral, Daily, Do not crush or chew.    ezetimibe (ZETIA) 10 mg, oral, Daily    folic acid (Folvite) 400 mcg tablet 1 tablet, oral, Daily    hydrOXYzine pamoate (VISTARIL) 25 mg, oral, 3 times daily PRN    lisinopril 40 mg, oral, Daily    olopatadine (Patanol) 0.1 % ophthalmic solution 1 drop, Both Eyes, Every 12 hours    rosuvastatin (CRESTOR) 40 mg, oral, Daily    Travatan Z 0.004 % drops ophthalmic solution Every 24 hours        ROS:  A 14 point review of systems was done and is negative other than as stated in HPI    Vitals:      12/13/2022     8:43 AM 6/15/2023    10:09 AM 11/3/2023     1:32 PM 2/20/2024     2:56 PM 3/21/2024     9:56 AM 6/20/2024     1:23 PM 10/14/2024     1:39 PM   Vitals   Systolic 128 112 144 124 110 122 148   Diastolic 74 68 86 72 70 80 83   Heart Rate 74 68 77 72  79 74   Temp  36.1 °C (97 °F)  36.3 °C (97.3 °F)  36.5 °C (97.7 °F)    Resp 16 16  15   16   Height (in) 1.486 m (4' 10.5\") 1.511 m (4' 11.5\") 1.499 m (4' 11\") 1.499 m (4' 11\") 1.499 m (4' 11\") 1.499 m (4' 11\") 1.499 m (4' 11\")   Weight (lb) 136 136 137.4 138 136 136 128   BMI 27.94 kg/m2 27.01 kg/m2 27.75 kg/m2 27.87 kg/m2 27.47 kg/m2 27.47 kg/m2 25.85 kg/m2   BSA (m2) 1.6 m2 1.61 m2 1.61 m2 1.61 m2 1.6 m2 1.6 m2 1.56 m2        Physical Exam:   Constitutional: Cooperative, in no acute distress, alert, appears stated age.   Skin: Skin color, texture, turgor normal. No rashes or lesions.   Head: " Normocephalic. No masses, lesions, tenderness or abnormalities   Eyes: Extraocular movements are grossly intact.   Mouth and throat: Mucous membranes moist   Neck: Neck supple, right carotid bruit, no JVD   Respiratory: Lungs clear to auscultation, no wheezing or rhonchi, no use of accessory muscles   Chest wall: No scars, normal excursion with respiration   Cardiovascular: Regular rhythm, + murmur  Gastrointestinal: Abdomen soft, nontender. Bowel sounds normal.   Musculoskeletal: Strength equal in upper extremities   Extremities: 1+ pitting edema   Neurologic: Sensation grossly intact, alert and oriented x3.        Intake/Output:   No intake/output data recorded.    Outpatient Medications  Current Outpatient Medications on File Prior to Visit   Medication Sig Dispense Refill    amLODIPine (Norvasc) 10 mg tablet Take 1 tablet (10 mg) by mouth once daily. 90 tablet 1    aspirin 81 mg EC tablet Take 1 tablet (81 mg) by mouth once daily.      cyanocobalamin (Vitamin B-12) 500 mcg tablet Take 1 tablet (500 mcg) by mouth once daily.      dorzolamide-timoloL (Cosopt) 22.3-6.8 mg/mL ophthalmic solution every 12 hours.      DULoxetine (Cymbalta) 30 mg DR capsule Take 1 capsule (30 mg) by mouth once daily. Do not crush or chew. 30 capsule 2    ezetimibe (Zetia) 10 mg tablet Take 1 tablet (10 mg) by mouth once daily. 90 tablet 3    folic acid (Folvite) 400 mcg tablet Take 1 tablet (0.4 mg) by mouth once daily.      hydrOXYzine pamoate (Vistaril) 25 mg capsule Take 1 capsule (25 mg) by mouth 3 times a day as needed for anxiety.      lisinopril 40 mg tablet Take 1 tablet (40 mg) by mouth once daily.      olopatadine (Patanol) 0.1 % ophthalmic solution Administer 1 drop into both eyes every 12 hours.      rosuvastatin (Crestor) 40 mg tablet Take 1 tablet (40 mg) by mouth once daily. 90 tablet 1    Travatan Z 0.004 % drops ophthalmic solution once every 24 hours.       No current facility-administered medications on file prior to  visit.       Labs: (past 26 weeks)  Recent Results (from the past 26 weeks)   Comprehensive Metabolic Panel    Collection Time: 05/30/24 11:56 AM   Result Value Ref Range    Glucose 89 74 - 99 mg/dL    Sodium 141 136 - 145 mmol/L    Potassium 4.3 3.5 - 5.3 mmol/L    Chloride 104 98 - 107 mmol/L    Bicarbonate 27 21 - 32 mmol/L    Anion Gap 14 10 - 20 mmol/L    Urea Nitrogen 19 6 - 23 mg/dL    Creatinine 1.01 0.50 - 1.05 mg/dL    eGFR 59 (L) >60 mL/min/1.73m*2    Calcium 10.0 8.6 - 10.3 mg/dL    Albumin 4.5 3.4 - 5.0 g/dL    Alkaline Phosphatase 81 33 - 136 U/L    Total Protein 7.3 6.4 - 8.2 g/dL    AST 28 9 - 39 U/L    Bilirubin, Total 0.6 0.0 - 1.2 mg/dL    ALT 31 7 - 45 U/L   Lipid Panel    Collection Time: 05/30/24 11:56 AM   Result Value Ref Range    Cholesterol 174 0 - 199 mg/dL    HDL-Cholesterol 69.7 mg/dL    Cholesterol/HDL Ratio 2.5     LDL Calculated 79 <=99 mg/dL    VLDL 25 0 - 40 mg/dL    Triglycerides 127 0 - 149 mg/dL    Non HDL Cholesterol 104 0 - 149 mg/dL   Magnesium    Collection Time: 05/30/24 11:56 AM   Result Value Ref Range    Magnesium 2.24 1.60 - 2.40 mg/dL   CBC    Collection Time: 05/30/24 11:56 AM   Result Value Ref Range    WBC 6.1 4.4 - 11.3 x10*3/uL    nRBC 0.0 0.0 - 0.0 /100 WBCs    RBC 4.83 4.00 - 5.20 x10*6/uL    Hemoglobin 14.4 12.0 - 16.0 g/dL    Hematocrit 43.5 36.0 - 46.0 %    MCV 90 80 - 100 fL    MCH 29.8 26.0 - 34.0 pg    MCHC 33.1 32.0 - 36.0 g/dL    RDW 13.2 11.5 - 14.5 %    Platelets 203 150 - 450 x10*3/uL   Hemoglobin A1C    Collection Time: 05/30/24 11:56 AM   Result Value Ref Range    Hemoglobin A1C 5.7 (H) see below %    Estimated Average Glucose 117 Not Established mg/dL   Hepatitis C Antibody    Collection Time: 05/30/24 11:56 AM   Result Value Ref Range    Hepatitis C AB Nonreactive Nonreactive       ECG  No results found for this or any previous visit (from the past 4464 hours).    Echocardiogram  No results found for this or any previous visit from the past 1095  days.      CV Studies:  EKG:No results found for this or any previous visit (from the past 4464 hours).  Echocardiogram:   [place] [address]  Phone [phone] Fax [phone]    TRANSTHORACIC ECHOCARDIOGRAM REPORT      Patient Name:     JANICE LACY        Nelson Physician:  [id number] Mathieu RODRIGEZ MD  Study Date:       10/27/2022       Referring           SOLA LUJAN  Physician:  MRN/PID:          [id number]         PCP:  Accession/Order#: [id number]     [facility] Echo Lab  Location:  YOB: 1950        Fellow:  Gender:           F                Nurse:  Admit Date:                        Sonographer:        Yamilka Ryan [facility]  Admission Status: Outpatient       Additional Staff:  Height:           149.86 cm        CC Report to:  Weight:           61.23 kg         Study Type:         Echocardiogram  BSA:              1.56 m2  Blood Pressure: 118 /74 mmHg    Diagnosis/ICD: I10-Essential (primary) hypertension; R07.9-Chest pain,  unspecified  Indication:    Hypertension, Chest Pain  Procedure/CPT: Echo Complete w Full [id number]    Patient History:  Pertinent History: HTN.    Study Detail: The following Echo studies were performed: 2D, M-Mode, Doppler and  color flow.      PHYSICIAN INTERPRETATION:  Left Ventricle: Left ventricular systolic function is normal, with an estimated ejection fraction of 60-65%. There are no regional wall motion abnormalities. The left ventricular cavity size is normal. Spectral Doppler shows a normal pattern of left ventricular diastolic filling.  Left Atrium: The left atrium is normal in size.  Right Ventricle: The right ventricle is normal in size. There is normal right ventricular global systolic function.  Right Atrium: The right atrium is normal in size.  Aortic Valve: The aortic valve is probably trileaflet. There is no  evidence of aortic valve regurgitation. The peak instantaneous gradient of the aortic valve is 11.3 mmHg. The mean gradient of the aortic valve is 6.0 mmHg.  Mitral Valve: The mitral valve is normal in structure. There is no evidence of mitral valve regurgitation.  Tricuspid Valve: The tricuspid valve is structurally normal. No evidence of tricuspid regurgitation.  Pulmonic Valve: The pulmonic valve is structurally normal. There is physiologic pulmonic valve regurgitation.  Pericardium: There is no pericardial effusion noted.  Aorta: The aortic root is normal.      CONCLUSIONS:  1. Left ventricular systolic function is normal with a 60-65% estimated ejection fraction.    QUANTITATIVE DATA SUMMARY:  2D MEASUREMENTS:  Normal Ranges:  LAs:           3.19 cm   (2.7-4.0cm)  IVSd:          0.81 cm   (0.6-1.1cm)  LVPWd:         0.85 cm   (0.6-1.1cm)  LVIDd:         3.66 cm   (3.9-5.9cm)  LVIDs:         2.42 cm  LV Mass Index: 54.3 g/m2  LV % FS        34.0 %    LA VOLUME:  Normal Ranges:  LA Vol A4C:        36.7 ml    (22+/-6mL/m2)  LA Vol A2C:        29.0 ml  LA Vol BP:         33.3 ml  LA Vol Index A4C:  23.5 ml/m2  LA Vol Index A2C:  18.6 ml/m2  LA Vol Index BP:   21.4 ml/m2  LA Area A4C:       14.7 cm2  LA Area A2C:       12.8 cm2  LA Major Axis A4C: 5.0 cm  LA Major Axis A2C: 4.8 cm  LA Volume Index:   21.0 ml/m2  LA Vol A4C:        35.1 ml  LA Vol A2C:        27.9 ml    RA VOLUME BY A/L METHOD:  Normal Ranges:  RA Area A4C: 11.5 cm2    AORTA MEASUREMENTS:  Normal Ranges:  Ao Sinus, d: 2.70 cm (2.1-3.5cm)  Ao STJ, d:   2.40 cm (1.7-3.4cm)  Asc Ao, d:   2.80 cm (2.1-3.4cm)    LV SYSTOLIC FUNCTION BY 2D PLANIMETRY (MOD):  Normal Ranges:  EF-A4C View: 69.6 % (>55%)  EF-A2C View: 65.7 %  EF-Biplane:  65.0 %    LV DIASTOLIC FUNCTION:  Normal Ranges:  MV Peak E:        0.81 m/s    (0.7-1.2 m/s)  MV Peak A:        0.89 m/s    (0.42-0.7 m/s)  E/A Ratio:        0.91        (1.0-2.2)  MV e'             0.10 m/s    (>8.0)  MV  lateral e'     0.11 m/s  MV medial e'      0.09 m/s  MV A Dur:         117.03 msec  E/e' Ratio:       8.08        (<8.0)  PulmV A Revs Dur: 142.72 msec    MITRAL VALVE:  Normal Ranges:  MV DT: 190 msec (150-240msec)    AORTIC VALVE:  Normal Ranges:  AoV Vmax:                1.68 m/s  (<1.7m/s)  AoV Peak P.3 mmHg (<20mmHg)  AoV Mean P.0 mmHg  (1.7-11.5mmHg)  LVOT Max Filiberto:            1.57 m/s  (<1.1m/s)  AoV VTI:                 36.75 cm  (18-25cm)  LVOT VTI:                31.61 cm  LVOT Diameter:           1.78 cm   (1.8-2.4cm)  AoV Area, VTI:           2.14 cm2  (2.5-5.5cm2)  AoV Area,Vmax:           2.32 cm2  (2.5-4.5cm2)  AoV Dimensionless Index: 0.86    RIGHT VENTRICLE:  RV 1   3.0 cm  RV 2   2.1 cm  RV 3   6.6 cm  TAPSE: 20.2 mm  RV s'  0.13 m/s    TRICUSPID VALVE/RVSP:  Normal Ranges:  Peak TR Velocity: 1.09 m/s  RV Syst Pressure: 7.7 mmHg (< 30mmHg)  TV E Vmax:        0.45 m/s (0.3-0.7m/s)  TV A Vmax:        0.57 m/s  IVC Diam:         1.40 cm    Pulmonary Veins:  PulmV A Revs Dur: 142.72 msec    AORTA:  Asc Ao Diam 2.80 cm      [id number] Mathieu Mercer MD  Electronically signed on 10/27/2022 at 5:02:11 PM         Final     Stress Testing IMGRESULT([id number]:1:1825):   NM cardiac stress rest (myocardial perfusion MIBI) 10/07/2022    Narrative  MRN: [id number]  Patient Name: JAINCE RODRIGEZ    STUDY:  CARDIAC STRESS/REST INJECTION; PART 2 STRESS OR REST (NO CHARGE);  CARDIAC STRESS/REST (MYOCARDIAL PERFUSION/MIBI);  10/7/2022 11:11 am    INDICATION:  Chest Pain  I10: Essential hypertension E78.2: Mixed hyperlipidemia  R07.9: Chest pain.    COMPARISON:  None.    ACCESSION NUMBER(S):  [id number]; [id number]; [id number]    ORDERING CLINICIAN:  SOLA LUJAN    TECHNIQUE:  DIVISION OF NUCLEAR MEDICINE  STRESS MYOCARDIAL PERFUSION SCAN, ONE DAY PROTOCOL    The patient received an intravenous dose of  11.1 mCi of Tc-99m  tetrofosmin and resting emission tomographic (SPECT) images of  the  myocardium were acquired. The patient then exercised via treadmill  stress to  101 % of MPHR and achieved  7.6 METS. At peak stress  34.6  mCi of Tc-99m tetrofosmin were administered and stress phase SPECT  images of the myocardium were then acquired. These included ECG-gated  images to assess and quantify ventricular function.    FINDINGS:    There is normal perfusion in all major segments. There is normal wall  motion and normal left ventricular function . The gated ejection  fraction is  92%(Normal over 50%).    Impression  1. Normal perfusion without evidence of ischemia or prior infarct.  2. Normal left ventricular systolic function.    Cardiac Catheterization: No results found for this or any previous visit from the past 1825 days.  No results found for this or any previous visit from the past 3650 days.     Cardiac Scoring:   CT heart calcium scoring wo IV contrast 2022    Narrative  MRN: [id number]  Patient Name: JANICE RODRIGEZ    STUDY:  CT CARDIAC SCORING;  2022 7:26 am    INDICATION:  Chest Pain  I10: Essential hypertension E78.2: Mixed hyperlipidemia  R07.9: Chest pain.    COMPARISON:  None.    ACCESSION NUMBER(S):  [id number]    ORDERING CLINICIAN:  SOLA LUJAN    TECHNIQUE:  Using prospective ECG gating, CT scan of the coronary arteries was  performed without intravenous contrast. Coronary calcium scoring  was  performed according to the method of Agatston.    FINDINGS:  The score and distribution of calcium in the coronary arteries is as  follows:    Left Main Coronary: 0.  Left Anterior Descendin.2.  Left Circumflex: 0.  Right Coronary Artery: 460.05.    Total: 1023.25.    There is bilateral dependent atelectasis.    The aorta is without aneurysmal dilatation evident.Calcified  atheromatous disease is seen of the aorta.    The heart is not enlarged. No pericardial effusion is evident.    No hilar or mediastinal lymphadenopathy is evident.    There is a hypodense structure in the  "left lobe of the liver which is  too small fully characterize but likely represents a cyst.    Impression  1. Coronary artery calcium score of 1023.25*.    *Coronary artery calcium scoring may be helpful in predicting the  risk for future coronary heart disease events.  According to the  American College of Cardiology Foundation Clinical Expert Consensus  Task Force, such testing provides important prognostic information in  patients with more than one coronary heart disease risk factor. The  coronary artery calcium score correlates with the annual risk of a  non-fatal myocardial infarction or coronary heart disease death.    Coronary artery score            Annual Risk    0-99                             0.4%  100-399                        1.3%  >400                            2.4%    These three \"breakpoints\" correspond to lower, intermediate and high  risk states for future coronary events.  Such information should be  used, along with appropriate clinical judgment, to make decisions  regarding the intensity of risk factor management strategies to treat  blood lipids and to modify other non-lipid coronary risk factors.    Reference: [place] P et al. Circulation.  2007; 115:402-426    AAA : No results found for this or any previous visit from the past 1825 days.    OTHER: No results found for this or any previous visit from the past 1825 days.    LAST IMAGING RESULTS  XR cervical spine 2-3 views  Narrative: Interpreted By:  Rey Boyd,   STUDY:  XR CERVICAL SPINE 2-3 VIEWS; ;  10/14/2024 2:54 pm      INDICATION:  Signs/Symptoms:neck pain, prior surgery.      ,M54.2 Cervicalgia      COMPARISON:  None.      ACCESSION NUMBER(S):  [id number]      ORDERING CLINICIAN:  DORIAN HOWARD      FINDINGS:  C-spine, three views      There is moderate facet disease in the mid to lower cervical spine  worse on the right. There is moderate multilevel disc space narrowing  with osteophytosis throughout the cervical spine. " No fracture seen.  There is no spondylolisthesis      Impression:         Moderate multilevel facet disease and spondylosis in the mid to lower  cervical spine      MACRO:  None      Signed by: Rey Boyd 10/15/2024 11:05 PM  Dictation workstation:   [id number]      Problem List Items Addressed This Visit       Carotid artery disease (CMS-HCC)    Essential hypertension    Mixed hyperlipidemia    Coronary artery calcification - Primary          Jorge Farrell DO, FACC, FACOI

## 2024-11-04 ENCOUNTER — TELEPHONE (OUTPATIENT)
Dept: PRIMARY CARE | Facility: CLINIC | Age: 74
End: 2024-11-04
Payer: MEDICARE

## 2024-11-04 DIAGNOSIS — Z12.31 ENCOUNTER FOR SCREENING MAMMOGRAM FOR MALIGNANT NEOPLASM OF BREAST: Primary | ICD-10-CM

## 2024-11-04 NOTE — TELEPHONE ENCOUNTER
Pt is supposed to get mammogram soon, no order placed. Can we place order by Friday? PT going to facility for another appt and wanted to get mammo done at same locatin Friday.

## 2024-11-08 ENCOUNTER — APPOINTMENT (OUTPATIENT)
Dept: CARDIOLOGY | Facility: CLINIC | Age: 74
End: 2024-11-08
Payer: MEDICARE

## 2024-11-08 VITALS
BODY MASS INDEX: 25.8 KG/M2 | DIASTOLIC BLOOD PRESSURE: 70 MMHG | HEART RATE: 71 BPM | WEIGHT: 128 LBS | HEIGHT: 59 IN | SYSTOLIC BLOOD PRESSURE: 118 MMHG

## 2024-11-08 DIAGNOSIS — I25.10 CORONARY ARTERY CALCIFICATION: Primary | ICD-10-CM

## 2024-11-08 DIAGNOSIS — I10 ESSENTIAL HYPERTENSION: ICD-10-CM

## 2024-11-08 DIAGNOSIS — R60.0 LOWER EXTREMITY EDEMA: ICD-10-CM

## 2024-11-08 DIAGNOSIS — I65.23 BILATERAL CAROTID ARTERY STENOSIS: ICD-10-CM

## 2024-11-08 DIAGNOSIS — E78.2 MIXED HYPERLIPIDEMIA: ICD-10-CM

## 2024-11-08 PROCEDURE — 99214 OFFICE O/P EST MOD 30 MIN: CPT | Performed by: INTERNAL MEDICINE

## 2024-11-08 PROCEDURE — 1123F ACP DISCUSS/DSCN MKR DOCD: CPT | Performed by: INTERNAL MEDICINE

## 2024-11-08 PROCEDURE — 93005 ELECTROCARDIOGRAM TRACING: CPT | Performed by: INTERNAL MEDICINE

## 2024-11-08 PROCEDURE — 3078F DIAST BP <80 MM HG: CPT | Performed by: INTERNAL MEDICINE

## 2024-11-08 PROCEDURE — 93010 ELECTROCARDIOGRAM REPORT: CPT | Performed by: INTERNAL MEDICINE

## 2024-11-08 PROCEDURE — 1036F TOBACCO NON-USER: CPT | Performed by: INTERNAL MEDICINE

## 2024-11-08 PROCEDURE — 3008F BODY MASS INDEX DOCD: CPT | Performed by: INTERNAL MEDICINE

## 2024-11-08 PROCEDURE — 1159F MED LIST DOCD IN RCRD: CPT | Performed by: INTERNAL MEDICINE

## 2024-11-08 PROCEDURE — 3074F SYST BP LT 130 MM HG: CPT | Performed by: INTERNAL MEDICINE

## 2024-11-25 ENCOUNTER — HOSPITAL ENCOUNTER (OUTPATIENT)
Dept: RADIOLOGY | Facility: HOSPITAL | Age: 74
Discharge: HOME | End: 2024-11-25
Payer: MEDICARE

## 2024-11-25 VITALS — BODY MASS INDEX: 25.6 KG/M2 | WEIGHT: 127 LBS | HEIGHT: 59 IN

## 2024-11-25 DIAGNOSIS — Z12.31 ENCOUNTER FOR SCREENING MAMMOGRAM FOR MALIGNANT NEOPLASM OF BREAST: ICD-10-CM

## 2024-11-25 PROCEDURE — 77067 SCR MAMMO BI INCL CAD: CPT | Performed by: RADIOLOGY

## 2024-11-25 PROCEDURE — 77063 BREAST TOMOSYNTHESIS BI: CPT

## 2024-11-25 PROCEDURE — 77063 BREAST TOMOSYNTHESIS BI: CPT | Performed by: RADIOLOGY

## 2024-12-09 ENCOUNTER — APPOINTMENT (OUTPATIENT)
Dept: PAIN MEDICINE | Facility: HOSPITAL | Age: 74
End: 2024-12-09
Payer: MEDICARE

## 2024-12-12 DIAGNOSIS — I10 ESSENTIAL HYPERTENSION: ICD-10-CM

## 2024-12-12 RX ORDER — AMLODIPINE BESYLATE 10 MG/1
10 TABLET ORAL DAILY
Qty: 90 TABLET | Refills: 0 | Status: SHIPPED | OUTPATIENT
Start: 2024-12-12 | End: 2025-03-12

## 2024-12-12 NOTE — TELEPHONE ENCOUNTER
Pt called rx line at 1102a requesting a refill on pended med - out before appt    Next OV 12/23  Pt compliant  Pt uses [facility] Thx

## 2024-12-13 ENCOUNTER — LAB (OUTPATIENT)
Dept: LAB | Facility: LAB | Age: 74
End: 2024-12-13
Payer: MEDICARE

## 2024-12-13 DIAGNOSIS — E78.2 MIXED HYPERLIPIDEMIA: ICD-10-CM

## 2024-12-13 DIAGNOSIS — R60.0 LOWER EXTREMITY EDEMA: ICD-10-CM

## 2024-12-13 DIAGNOSIS — I25.10 CORONARY ARTERY CALCIFICATION: ICD-10-CM

## 2024-12-13 DIAGNOSIS — R73.03 PREDIABETES: ICD-10-CM

## 2024-12-13 DIAGNOSIS — I10 ESSENTIAL HYPERTENSION: ICD-10-CM

## 2024-12-13 DIAGNOSIS — N18.31 STAGE 3A CHRONIC KIDNEY DISEASE (MULTI): ICD-10-CM

## 2024-12-13 DIAGNOSIS — I65.23 BILATERAL CAROTID ARTERY STENOSIS: ICD-10-CM

## 2024-12-13 LAB
ALBUMIN SERPL BCP-MCNC: 4.6 G/DL (ref 3.4–5)
ALP SERPL-CCNC: 62 U/L (ref 33–136)
ALT SERPL W P-5'-P-CCNC: 21 U/L (ref 7–45)
ANION GAP SERPL CALC-SCNC: 14 MMOL/L (ref 10–20)
AST SERPL W P-5'-P-CCNC: 24 U/L (ref 9–39)
BILIRUB SERPL-MCNC: 0.6 MG/DL (ref 0–1.2)
BUN SERPL-MCNC: 18 MG/DL (ref 6–23)
CALCIUM SERPL-MCNC: 10 MG/DL (ref 8.6–10.3)
CHLORIDE SERPL-SCNC: 101 MMOL/L (ref 98–107)
CHOLEST SERPL-MCNC: 170 MG/DL (ref 0–199)
CHOLESTEROL/HDL RATIO: 2.3
CO2 SERPL-SCNC: 25 MMOL/L (ref 21–32)
CREAT SERPL-MCNC: 1.08 MG/DL (ref 0.5–1.05)
EGFRCR SERPLBLD CKD-EPI 2021: 55 ML/MIN/1.73M*2
GLUCOSE SERPL-MCNC: 82 MG/DL (ref 74–99)
HDLC SERPL-MCNC: 72.5 MG/DL
LDLC SERPL CALC-MCNC: 74 MG/DL
NON HDL CHOLESTEROL: 98 MG/DL (ref 0–149)
POTASSIUM SERPL-SCNC: 4.7 MMOL/L (ref 3.5–5.3)
PROT SERPL-MCNC: 7.2 G/DL (ref 6.4–8.2)
SODIUM SERPL-SCNC: 135 MMOL/L (ref 136–145)
TRIGL SERPL-MCNC: 117 MG/DL (ref 0–149)
VLDL: 23 MG/DL (ref 0–40)

## 2024-12-13 PROCEDURE — 80061 LIPID PANEL: CPT

## 2024-12-13 PROCEDURE — 36415 COLL VENOUS BLD VENIPUNCTURE: CPT

## 2024-12-13 PROCEDURE — 80053 COMPREHEN METABOLIC PANEL: CPT

## 2024-12-13 PROCEDURE — 83036 HEMOGLOBIN GLYCOSYLATED A1C: CPT

## 2024-12-14 LAB
EST. AVERAGE GLUCOSE BLD GHB EST-MCNC: 114 MG/DL
HBA1C MFR BLD: 5.6 %

## 2024-12-23 ENCOUNTER — APPOINTMENT (OUTPATIENT)
Dept: PRIMARY CARE | Facility: CLINIC | Age: 74
End: 2024-12-23
Payer: MEDICARE

## 2024-12-23 VITALS
TEMPERATURE: 97.1 F | DIASTOLIC BLOOD PRESSURE: 68 MMHG | SYSTOLIC BLOOD PRESSURE: 126 MMHG | OXYGEN SATURATION: 99 % | WEIGHT: 130.8 LBS | HEART RATE: 69 BPM | BODY MASS INDEX: 26.42 KG/M2

## 2024-12-23 DIAGNOSIS — I10 ESSENTIAL HYPERTENSION: Primary | ICD-10-CM

## 2024-12-23 DIAGNOSIS — R73.03 PREDIABETES: ICD-10-CM

## 2024-12-23 DIAGNOSIS — M10.9 GOUT, UNSPECIFIED CAUSE, UNSPECIFIED CHRONICITY, UNSPECIFIED SITE: ICD-10-CM

## 2024-12-23 DIAGNOSIS — N18.31 STAGE 3A CHRONIC KIDNEY DISEASE (MULTI): ICD-10-CM

## 2024-12-23 DIAGNOSIS — I77.9 CAROTID ARTERY DISEASE, UNSPECIFIED LATERALITY, UNSPECIFIED TYPE (CMS-HCC): ICD-10-CM

## 2024-12-23 DIAGNOSIS — E78.2 MIXED HYPERLIPIDEMIA: ICD-10-CM

## 2024-12-23 PROCEDURE — 99214 OFFICE O/P EST MOD 30 MIN: CPT | Performed by: FAMILY MEDICINE

## 2024-12-23 PROCEDURE — 1123F ACP DISCUSS/DSCN MKR DOCD: CPT | Performed by: FAMILY MEDICINE

## 2024-12-23 PROCEDURE — 3074F SYST BP LT 130 MM HG: CPT | Performed by: FAMILY MEDICINE

## 2024-12-23 PROCEDURE — G2211 COMPLEX E/M VISIT ADD ON: HCPCS | Performed by: FAMILY MEDICINE

## 2024-12-23 PROCEDURE — 1159F MED LIST DOCD IN RCRD: CPT | Performed by: FAMILY MEDICINE

## 2024-12-23 PROCEDURE — 3078F DIAST BP <80 MM HG: CPT | Performed by: FAMILY MEDICINE

## 2024-12-23 PROCEDURE — 1160F RVW MEDS BY RX/DR IN RCRD: CPT | Performed by: FAMILY MEDICINE

## 2024-12-23 PROCEDURE — 1036F TOBACCO NON-USER: CPT | Performed by: FAMILY MEDICINE

## 2024-12-23 RX ORDER — AMLODIPINE BESYLATE 10 MG/1
10 TABLET ORAL DAILY
Qty: 90 TABLET | Refills: 0 | Status: SHIPPED | OUTPATIENT
Start: 2024-12-23 | End: 2025-03-23

## 2024-12-23 ASSESSMENT — ENCOUNTER SYMPTOMS
DYSPHORIC MOOD: 0
POLYPHAGIA: 0
PALPITATIONS: 0
MYALGIAS: 0
POLYDIPSIA: 0
CONSTIPATION: 0
SLEEP DISTURBANCE: 0
VOMITING: 0
NAUSEA: 0
HYPERTENSION: 1
FATIGUE: 0
DIZZINESS: 0
DIARRHEA: 0
DYSURIA: 0
SHORTNESS OF BREATH: 0
HEADACHES: 0
ABDOMINAL PAIN: 0
BLOOD IN STOOL: 0

## 2024-12-23 NOTE — PATIENT INSTRUCTIONS
Recommend a predominant low fat whole foods plant based diet.  Cut back on meat, dairy, processed carbs, salt and oils(especially palm and coconut). Increase fiber in your diet.  Decrease alcohol as much as possible if you drink. Recommend regular exercise most days of the week(goal up to 150min per week). Also recommend good sleep habits aiming for 7-8 hours per night.     Continue your current meds    Follow up with your specialists as scheduled    Please bring in copies of your advance directives to your next appointment    Return in 6 months for recheck and wellness visit, sooner if needed

## 2024-12-23 NOTE — PROGRESS NOTES
Subjective   Patient ID: Jayshree Garcia is a 74 y.o. female who presents for Hypertension (Recheck, review blood work), Hyperlipidemia, and Prediabetes and multiple issues.     Hypertension  Pertinent negatives include no chest pain, headaches, palpitations or shortness of breath.   Hyperlipidemia  Pertinent negatives include no chest pain, myalgias or shortness of breath.      Lipids: remain controlled. HDL 72, LDL 74,   CKD: stable. Cr 1.08, GFR 55  HTN: remains controlled.   Predm: controlled. A1c 5.6%  Gout: stable. Stopped allopuinol 1 mo ago. No flares  Carotid stenosis: stable. Seeing cardiology    Review of Systems   Constitutional:  Negative for fatigue.   Eyes:  Negative for visual disturbance.   Respiratory:  Negative for shortness of breath.    Cardiovascular:  Negative for chest pain and palpitations.   Gastrointestinal:  Negative for abdominal pain, blood in stool, constipation, diarrhea, nausea and vomiting.   Endocrine: Negative for cold intolerance, heat intolerance, polydipsia, polyphagia and polyuria.   Genitourinary:  Negative for dysuria.   Musculoskeletal:  Negative for myalgias.   Skin:  Negative for rash.   Neurological:  Negative for dizziness and headaches.   Psychiatric/Behavioral:  Negative for dysphoric mood and sleep disturbance.        Objective   /68   Pulse 69   Temp 36.2 °C (97.1 °F)   Wt 59.3 kg (130 lb 12.8 oz)   SpO2 99%   BMI 26.42 kg/m²     Physical Exam  Vitals and nursing note reviewed.   Constitutional:       General: She is not in acute distress.     Appearance: Normal appearance. She is not toxic-appearing.   HENT:      Head: Normocephalic.      Mouth/Throat:      Pharynx: Oropharynx is clear.   Eyes:      General: No scleral icterus.     Pupils: Pupils are equal, round, and reactive to light.   Neck:      Vascular: No carotid bruit.   Cardiovascular:      Rate and Rhythm: Normal rate and regular rhythm.      Heart sounds: No murmur heard.  Pulmonary:       Effort: Pulmonary effort is normal. No respiratory distress.      Breath sounds: Normal breath sounds.   Abdominal:      Palpations: Abdomen is soft.      Tenderness: There is no abdominal tenderness. There is no guarding.   Musculoskeletal:         General: No tenderness.      Right lower leg: Edema (trace) present.      Left lower leg: Edema (trace) present.   Skin:     General: Skin is warm.   Neurological:      General: No focal deficit present.      Mental Status: She is alert.      Cranial Nerves: No cranial nerve deficit.   Psychiatric:         Mood and Affect: Mood normal.       Assessment/Plan   Problem List Items Addressed This Visit             ICD-10-CM    Carotid artery disease (CMS-HCC) I77.9    Essential hypertension - Primary I10    Relevant Medications    amLODIPine (Norvasc) 10 mg tablet    Other Relevant Orders    Comprehensive Metabolic Panel    Gout M10.9    Relevant Orders    Uric Acid    Mixed hyperlipidemia E78.2    Relevant Orders    Comprehensive Metabolic Panel    Prediabetes R73.03    Relevant Orders    Comprehensive Metabolic Panel    Stage 3a chronic kidney disease (Multi) N18.31    Relevant Orders    Comprehensive Metabolic Panel   Discussed bw. Recommendations given

## 2025-01-25 DIAGNOSIS — E78.2 MIXED HYPERLIPIDEMIA: ICD-10-CM

## 2025-01-25 NOTE — TELEPHONE ENCOUNTER
----- Message from Nurse Vale LOUISE sent at 1/24/2025  5:04 PM EST -----  Regarding: Refill  Patient is requesting a refill of Zetia 10 mg to be sent to [facility]

## 2025-01-26 RX ORDER — EZETIMIBE 10 MG/1
10 TABLET ORAL DAILY
Qty: 90 TABLET | Refills: 3 | Status: SHIPPED | OUTPATIENT
Start: 2025-01-26

## 2025-04-08 NOTE — PROGRESS NOTES
Subjective   Patient ID: Jayshree Garcia is a 74 y.o. female who presents for Annual Exam (Denies problems.).  She presents for annual exam. She has been diagnosed with kidney disease.  Pap and HPV were negative 3/21/2024. She is s/p LEEP in 2016 for HPV 16 positive pap and inadequate colposcopy with final pathology without dysplasia but with HPV changes. Cervical cancer screening will be needed until 2036.   DEXA 1/16/2023 returned with normal bone density.   Mammogram returned normal, next due November 2025.        Review of Systems   Constitutional:  Negative for activity change.   HENT:  Negative for congestion.    Respiratory:  Negative for apnea and cough.    Cardiovascular:  Negative for chest pain.   Gastrointestinal:  Negative for constipation and diarrhea.   Genitourinary:  Negative for hematuria and vaginal pain.   Musculoskeletal:  Negative for joint swelling.   Neurological:  Negative for dizziness.   Psychiatric/Behavioral:  Negative for agitation.        Past Medical History:   Diagnosis Date   • Acute atopic conjunctivitis, bilateral 06/09/2020    Allergic conjunctivitis of both eyes   • Acute vaginitis 11/09/2020    Bacterial vaginosis   • Allergic    • Cervical high risk human papillomavirus (HPV) DNA test positive     Cervical high risk HPV (human papillomavirus) test positive   • Chronic kidney disease 2023   • Encounter for other screening for malignant neoplasm of breast 10/21/2019    Screening for breast cancer   • Erythrocytosis 06/15/2023   • Glaucoma 1998   • Hypertension 1998   • Mixed hyperlipidemia 10/06/2022    Mixed hyperlipidemia   • Mixed hyperlipidemia 10/06/2022    Mixed hyperlipidemia   • Mixed hyperlipidemia 10/06/2022    Mixed hyperlipidemia   • Mixed hyperlipidemia 10/06/2022    Mixed hyperlipidemia   • Other abnormal and inconclusive findings on diagnostic imaging of breast 11/04/2019    Abnormal mammogram of left breast   • Other seasonal allergic rhinitis 06/09/2020     Seasonal allergies   • Personal history of diseases of the skin and subcutaneous tissue 09/19/2022    History of pityriasis rosea   • Personal history of other diseases of the digestive system 09/25/2019    History of angular cheilitis   • Personal history of other drug therapy 12/03/2019    History of influenza vaccination   • Personal history of other medical treatment 11/17/2022    History of screening mammography   • Personal history of other specified conditions 11/04/2022    History of chest pain   • Pityriasis rosea 09/19/2022   • Secondary polycythemia 08/15/2022    Erythrocytosis   • Secondary polycythemia 08/15/2022    Erythrocytosis   • Secondary polycythemia 08/15/2022    Erythrocytosis   • Varicella ?      Past Surgical History:   Procedure Laterality Date   • GANGLION CYST EXCISION     • OTHER SURGICAL HISTORY  10/21/2019    Cervical loop electrosurgical excision twice   • WISDOM TOOTH EXTRACTION        Allergies   Allergen Reactions   • Acetaminophen Hives and Unknown     Equate brand   • Allopurinol Swelling and Other     gum swelling    Dr. Barrientos's brand   Mouth dry   Lips peel   • Amlodipine Swelling, Other and Unknown     Gum swelling    [facility] brand   • Atorvastatin Other     Lupin Brand - REACTION: tongue goes numb, mouth soreness, dried lips, bleeding   • Latanoprost Swelling and Unknown      Current Outpatient Medications on File Prior to Visit   Medication Sig Dispense Refill   • amLODIPine (Norvasc) 10 mg tablet Take 1 tablet (10 mg) by mouth once daily. 90 tablet 0   • aspirin 81 mg EC tablet Take 1 tablet (81 mg) by mouth once daily.     • cyanocobalamin (Vitamin B-12) 500 mcg tablet Take 1 tablet (500 mcg) by mouth once daily.     • dorzolamide-timoloL (Cosopt) 22.3-6.8 mg/mL ophthalmic solution every 12 hours.     • ezetimibe (Zetia) 10 mg tablet Take 1 tablet (10 mg) by mouth once daily. 90 tablet 3   • folic acid (Folvite) 400 mcg tablet Take 1 tablet (0.4 mg) by mouth once  daily.     • lisinopril 40 mg tablet Take 1 tablet (40 mg) by mouth once daily.     • olopatadine (Patanol) 0.1 % ophthalmic solution Administer 1 drop into both eyes every 12 hours.     • rosuvastatin (Crestor) 40 mg tablet Take 1 tablet (40 mg) by mouth once daily. 90 tablet 1   • Travatan Z 0.004 % drops ophthalmic solution once every 24 hours.     • [DISCONTINUED] hydrOXYzine pamoate (Vistaril) 25 mg capsule Take 1 capsule (25 mg) by mouth 3 times a day as needed for anxiety.       No current facility-administered medications on file prior to visit.        Objective   Physical Exam  Constitutional:       Appearance: Normal appearance.   Neck:      Thyroid: No thyromegaly.   Cardiovascular:      Rate and Rhythm: Normal rate and regular rhythm.      Heart sounds: Normal heart sounds.   Pulmonary:      Effort: Pulmonary effort is normal.      Breath sounds: Normal breath sounds.   Chest:      Chest wall: No mass.   Breasts:     Right: Normal. No inverted nipple, mass, nipple discharge or skin change.      Left: Normal. No inverted nipple, mass, nipple discharge or skin change.   Abdominal:      General: There is no distension.      Palpations: Abdomen is soft. There is no mass.      Tenderness: There is no abdominal tenderness.   Genitourinary:     General: Normal vulva.      Exam position: Lithotomy position.      Labia:         Right: No rash.         Left: No rash.       Urethra: No urethral lesion.      Vagina: Normal. No lesions.      Cervix: No friability or lesion.      Uterus: Normal. Not enlarged and not tender.       Adnexa: Right adnexa normal and left adnexa normal.        Right: No mass or tenderness.          Left: No mass or tenderness.        Comments: Atrophy is noted.  Musculoskeletal:         General: No deformity.      Cervical back: Neck supple.   Lymphadenopathy:      Cervical: No cervical adenopathy.   Skin:     General: Skin is warm and dry.      Findings: No rash.   Neurological:       General: No focal deficit present.      Mental Status: She is alert.   Psychiatric:         Mood and Affect: Mood normal.         Behavior: Behavior is cooperative.         Thought Content: Thought content normal.         Problem List Items Addressed This Visit          Medium    Human papillomavirus (HPV) type 16 DNA detected in cervical specimen - Primary    Overview     She is s/p LEEP in 2016 for HPV 16 positive pap and inadequate colposcopy with final pathology without dysplasia but with HPV changes. Cervical cancer screening will be needed until 2036.          Current Assessment & Plan     Pap is not yet indicated.          Encounter for well woman exam with abnormal findings    Overview     Pap and HPV were negative 3/21/2024.   She is s/p LEEP in 2016 for HPV 16 positive pap and inadequate colposcopy with final pathology without dysplasia but with HPV changes. Cervical cancer screening will be needed until 2036.   DEXA 1/16/2023 returned with normal bone density.          Current Assessment & Plan     Pap is next indicated in 2027.  Mammogram is due in November.  Regular exercise and attaining/maintaining a healthy weight is encouraged.   Adequate calcium intake with diet or supplements is encouraged.    We will notify of any abnormal results.

## 2025-04-08 NOTE — ASSESSMENT & PLAN NOTE
Pap is next indicated in 2027.  Mammogram is due in November.  Regular exercise and attaining/maintaining a healthy weight is encouraged.   Adequate calcium intake with diet or supplements is encouraged.    We will notify of any abnormal results.

## 2025-04-10 ENCOUNTER — APPOINTMENT (OUTPATIENT)
Dept: OBSTETRICS AND GYNECOLOGY | Facility: CLINIC | Age: 75
End: 2025-04-10
Payer: MEDICARE

## 2025-04-10 VITALS
SYSTOLIC BLOOD PRESSURE: 128 MMHG | BODY MASS INDEX: 26.61 KG/M2 | HEIGHT: 59 IN | WEIGHT: 132 LBS | DIASTOLIC BLOOD PRESSURE: 62 MMHG

## 2025-04-10 DIAGNOSIS — Z01.411 ENCOUNTER FOR WELL WOMAN EXAM WITH ABNORMAL FINDINGS: ICD-10-CM

## 2025-04-10 DIAGNOSIS — R87.810 HUMAN PAPILLOMAVIRUS (HPV) TYPE 16 DNA DETECTED IN CERVICAL SPECIMEN: Primary | ICD-10-CM

## 2025-04-10 PROCEDURE — 3008F BODY MASS INDEX DOCD: CPT | Performed by: OBSTETRICS & GYNECOLOGY

## 2025-04-10 PROCEDURE — G0101 CA SCREEN;PELVIC/BREAST EXAM: HCPCS | Performed by: OBSTETRICS & GYNECOLOGY

## 2025-04-10 PROCEDURE — 3078F DIAST BP <80 MM HG: CPT | Performed by: OBSTETRICS & GYNECOLOGY

## 2025-04-10 PROCEDURE — 1159F MED LIST DOCD IN RCRD: CPT | Performed by: OBSTETRICS & GYNECOLOGY

## 2025-04-10 PROCEDURE — 1036F TOBACCO NON-USER: CPT | Performed by: OBSTETRICS & GYNECOLOGY

## 2025-04-10 PROCEDURE — 3074F SYST BP LT 130 MM HG: CPT | Performed by: OBSTETRICS & GYNECOLOGY

## 2025-04-10 PROCEDURE — 1160F RVW MEDS BY RX/DR IN RCRD: CPT | Performed by: OBSTETRICS & GYNECOLOGY

## 2025-04-10 PROCEDURE — G2211 COMPLEX E/M VISIT ADD ON: HCPCS | Performed by: OBSTETRICS & GYNECOLOGY

## 2025-04-10 PROCEDURE — 1123F ACP DISCUSS/DSCN MKR DOCD: CPT | Performed by: OBSTETRICS & GYNECOLOGY

## 2025-04-10 ASSESSMENT — ENCOUNTER SYMPTOMS
DIARRHEA: 0
ACTIVITY CHANGE: 0
AGITATION: 0
HEMATURIA: 0
COUGH: 0
DIZZINESS: 0
JOINT SWELLING: 0
APNEA: 0
CONSTIPATION: 0

## 2025-04-10 ASSESSMENT — COLUMBIA-SUICIDE SEVERITY RATING SCALE - C-SSRS
1. IN THE PAST MONTH, HAVE YOU WISHED YOU WERE DEAD OR WISHED YOU COULD GO TO SLEEP AND NOT WAKE UP?: NO
6. HAVE YOU EVER DONE ANYTHING, STARTED TO DO ANYTHING, OR PREPARED TO DO ANYTHING TO END YOUR LIFE?: NO
2. HAVE YOU ACTUALLY HAD ANY THOUGHTS OF KILLING YOURSELF?: NO

## 2025-04-10 ASSESSMENT — PATIENT HEALTH QUESTIONNAIRE - PHQ9
1. LITTLE INTEREST OR PLEASURE IN DOING THINGS: NOT AT ALL
SUM OF ALL RESPONSES TO PHQ9 QUESTIONS 1 AND 2: 0
2. FEELING DOWN, DEPRESSED OR HOPELESS: NOT AT ALL

## 2025-05-20 DIAGNOSIS — E78.2 MIXED HYPERLIPIDEMIA: ICD-10-CM

## 2025-05-20 RX ORDER — ROSUVASTATIN CALCIUM 40 MG/1
40 TABLET, COATED ORAL DAILY
Qty: 90 TABLET | Refills: 2 | Status: SHIPPED | OUTPATIENT
Start: 2025-05-20 | End: 2026-02-14

## 2025-06-14 LAB
ALBUMIN SERPL-MCNC: 4.9 G/DL (ref 3.6–5.1)
ALP SERPL-CCNC: 58 U/L (ref 37–153)
ALT SERPL-CCNC: 20 U/L (ref 6–29)
ANION GAP SERPL CALCULATED.4IONS-SCNC: 9 MMOL/L (CALC) (ref 7–17)
AST SERPL-CCNC: 25 U/L (ref 10–35)
BILIRUB SERPL-MCNC: 0.6 MG/DL (ref 0.2–1.2)
BUN SERPL-MCNC: 15 MG/DL (ref 7–25)
CALCIUM SERPL-MCNC: 10.4 MG/DL (ref 8.6–10.4)
CHLORIDE SERPL-SCNC: 98 MMOL/L (ref 98–110)
CO2 SERPL-SCNC: 26 MMOL/L (ref 20–32)
CREAT SERPL-MCNC: 1.01 MG/DL (ref 0.6–1)
EGFRCR SERPLBLD CKD-EPI 2021: 59 ML/MIN/1.73M2
GLUCOSE SERPL-MCNC: 93 MG/DL (ref 65–99)
POTASSIUM SERPL-SCNC: 4.3 MMOL/L (ref 3.5–5.3)
PROT SERPL-MCNC: 7.8 G/DL (ref 6.1–8.1)
SODIUM SERPL-SCNC: 133 MMOL/L (ref 135–146)
URATE SERPL-MCNC: 7.1 MG/DL (ref 2.5–7)

## 2025-06-23 ENCOUNTER — APPOINTMENT (OUTPATIENT)
Dept: PRIMARY CARE | Facility: CLINIC | Age: 75
End: 2025-06-23
Payer: MEDICARE

## 2025-06-23 VITALS
HEIGHT: 60 IN | OXYGEN SATURATION: 100 % | HEART RATE: 82 BPM | DIASTOLIC BLOOD PRESSURE: 68 MMHG | TEMPERATURE: 97.3 F | BODY MASS INDEX: 25.32 KG/M2 | WEIGHT: 129 LBS | SYSTOLIC BLOOD PRESSURE: 120 MMHG

## 2025-06-23 DIAGNOSIS — N18.31 STAGE 3A CHRONIC KIDNEY DISEASE (MULTI): ICD-10-CM

## 2025-06-23 DIAGNOSIS — E78.2 MIXED HYPERLIPIDEMIA: ICD-10-CM

## 2025-06-23 DIAGNOSIS — M10.9 GOUT, UNSPECIFIED CAUSE, UNSPECIFIED CHRONICITY, UNSPECIFIED SITE: ICD-10-CM

## 2025-06-23 DIAGNOSIS — R73.03 PREDIABETES: ICD-10-CM

## 2025-06-23 DIAGNOSIS — I10 ESSENTIAL HYPERTENSION: ICD-10-CM

## 2025-06-23 DIAGNOSIS — Z00.00 ROUTINE GENERAL MEDICAL EXAMINATION AT HEALTH CARE FACILITY: Primary | ICD-10-CM

## 2025-06-23 PROCEDURE — 3078F DIAST BP <80 MM HG: CPT | Performed by: FAMILY MEDICINE

## 2025-06-23 PROCEDURE — G0439 PPPS, SUBSEQ VISIT: HCPCS | Performed by: FAMILY MEDICINE

## 2025-06-23 PROCEDURE — 1159F MED LIST DOCD IN RCRD: CPT | Performed by: FAMILY MEDICINE

## 2025-06-23 PROCEDURE — G0446 INTENS BEHAVE THER CARDIO DX: HCPCS | Performed by: FAMILY MEDICINE

## 2025-06-23 PROCEDURE — 3074F SYST BP LT 130 MM HG: CPT | Performed by: FAMILY MEDICINE

## 2025-06-23 PROCEDURE — 99397 PER PM REEVAL EST PAT 65+ YR: CPT | Performed by: FAMILY MEDICINE

## 2025-06-23 PROCEDURE — 1036F TOBACCO NON-USER: CPT | Performed by: FAMILY MEDICINE

## 2025-06-23 PROCEDURE — 1160F RVW MEDS BY RX/DR IN RCRD: CPT | Performed by: FAMILY MEDICINE

## 2025-06-23 PROCEDURE — 1157F ADVNC CARE PLAN IN RCRD: CPT | Performed by: FAMILY MEDICINE

## 2025-06-23 PROCEDURE — 99214 OFFICE O/P EST MOD 30 MIN: CPT | Performed by: FAMILY MEDICINE

## 2025-06-23 PROCEDURE — 3008F BODY MASS INDEX DOCD: CPT | Performed by: FAMILY MEDICINE

## 2025-06-23 PROCEDURE — 1170F FXNL STATUS ASSESSED: CPT | Performed by: FAMILY MEDICINE

## 2025-06-23 RX ORDER — AMLODIPINE BESYLATE 10 MG/1
10 TABLET ORAL DAILY
Qty: 90 TABLET | Refills: 1 | Status: SHIPPED | OUTPATIENT
Start: 2025-06-23 | End: 2025-12-20

## 2025-06-23 ASSESSMENT — ENCOUNTER SYMPTOMS
PALPITATIONS: 0
VOMITING: 0
SHORTNESS OF BREATH: 0
NAUSEA: 0
ABDOMINAL PAIN: 0
DIZZINESS: 0
HEADACHES: 0
ABDOMINAL DISTENTION: 0
DYSURIA: 0
POLYDIPSIA: 0
DYSPHORIC MOOD: 0
SLEEP DISTURBANCE: 0
POLYPHAGIA: 0
HYPERTENSION: 1
DIFFICULTY URINATING: 0
BLOOD IN STOOL: 0
FATIGUE: 0
CONSTIPATION: 0
MYALGIAS: 0
DIARRHEA: 0

## 2025-06-23 ASSESSMENT — ACTIVITIES OF DAILY LIVING (ADL)
GROCERY_SHOPPING: INDEPENDENT
DRESSING: INDEPENDENT
TAKING_MEDICATION: INDEPENDENT
MANAGING_FINANCES: INDEPENDENT
BATHING: INDEPENDENT
DOING_HOUSEWORK: INDEPENDENT

## 2025-06-23 ASSESSMENT — PATIENT HEALTH QUESTIONNAIRE - PHQ9
SUM OF ALL RESPONSES TO PHQ9 QUESTIONS 1 AND 2: 0
2. FEELING DOWN, DEPRESSED OR HOPELESS: NOT AT ALL
1. LITTLE INTEREST OR PLEASURE IN DOING THINGS: NOT AT ALL

## 2025-06-23 NOTE — PATIENT INSTRUCTIONS
Recommend a predominant low fat whole foods plant based diet.  Cut back on meat, dairy, processed carbs, salt and oils(especially palm and coconut). Increase fiber in your diet.  Decrease alcohol as much as possible if you drink. Recommend regular exercise most days of the week(goal up to 150min per week). Also recommend good sleep habits aiming for 7-8 hours per night.     Follow up with your specialists as scheduled    Return in 6 months, sooner if needed

## 2025-06-23 NOTE — PROGRESS NOTES
"Subjective   Reason for Visit: Jayshree Garcia is an 75 y.o. female here for a Medicare Wellness visit, CPE and multiple issues.     Past Medical, Surgical, and Family History reviewed and updated in chart.    Reviewed all medications by prescribing practitioner or clinical pharmacist (such as prescriptions, OTCs, herbal therapies and supplements) and documented in the medical record.    Hypertension  Pertinent negatives include no chest pain, headaches, palpitations or shortness of breath.   Hyperlipidemia  Pertinent negatives include no chest pain, myalgias or shortness of breath.       Patient Care Team:  Earline Molina DO as PCP - General (Family Medicine)  Shalonda Multani MD as PCP - [employer] PCP   Dr. Farrell-cardiology  Dr. Hernadez-nephrology  Dr. Main-ophthalmology  Dr. Mosley-GYN    CKD: stable. Cr 1.01, GFR 59(55)  HTN: controlled  Lipids: have been stable.   Gout: Uric acid high 7.1.  No recent flares    Pain: occ neck pain  Opiates: denies  Review of Systems   Constitutional:  Negative for fatigue.   HENT: Negative.     Eyes:  Negative for visual disturbance.   Respiratory:  Negative for shortness of breath.    Cardiovascular:  Negative for chest pain and palpitations.   Gastrointestinal:  Negative for abdominal distention, abdominal pain, blood in stool, constipation, diarrhea, nausea and vomiting.   Endocrine: Negative for cold intolerance, heat intolerance, polydipsia, polyphagia and polyuria.   Genitourinary:  Negative for difficulty urinating and dysuria.   Musculoskeletal:  Negative for myalgias.   Skin:  Negative for rash.   Allergic/Immunologic: Positive for environmental allergies (spring/summer).   Neurological:  Negative for dizziness and headaches.   Psychiatric/Behavioral:  Negative for dysphoric mood and sleep disturbance.        Objective   Vitals:  /68   Pulse 82   Temp 36.3 °C (97.3 °F)   Ht 1.511 m (4' 11.5\")   Wt 58.5 kg (129 lb)   SpO2 100%   BMI " 25.62 kg/m²       Physical Exam  Vitals and nursing note reviewed.   Constitutional:       General: She is not in acute distress.     Appearance: Normal appearance. She is not toxic-appearing.   HENT:      Head: Normocephalic.      Right Ear: Tympanic membrane normal.      Left Ear: Tympanic membrane normal.      Nose: Nose normal.      Mouth/Throat:      Pharynx: Oropharynx is clear.   Eyes:      Pupils: Pupils are equal, round, and reactive to light.   Neck:      Vascular: No carotid bruit.   Cardiovascular:      Rate and Rhythm: Normal rate and regular rhythm.      Pulses: Normal pulses.      Heart sounds: No murmur heard.  Pulmonary:      Effort: Pulmonary effort is normal. No respiratory distress.      Breath sounds: Normal breath sounds.   Abdominal:      General: Bowel sounds are normal.      Palpations: Abdomen is soft.      Tenderness: There is no abdominal tenderness. There is no guarding.   Musculoskeletal:         General: No tenderness.      Cervical back: Neck supple.      Right lower leg: No edema.      Left lower leg: No edema.   Lymphadenopathy:      Cervical: No cervical adenopathy.   Skin:     General: Skin is warm.   Neurological:      General: No focal deficit present.      Mental Status: She is alert.      Cranial Nerves: No cranial nerve deficit.   Psychiatric:         Mood and Affect: Mood normal.         Assessment & Plan  Gout, unspecified cause, unspecified chronicity, unspecified site    Orders:    Uric Acid    Uric Acid; Future    Follow Up In Primary Care; Future    Essential hypertension    Orders:    Comprehensive Metabolic Panel    amLODIPine (Norvasc) 10 mg tablet; Take 1 tablet (10 mg) by mouth once daily.    Comprehensive Metabolic Panel; Future    Follow Up In Primary Care; Future    Mixed hyperlipidemia    Orders:    Comprehensive Metabolic Panel    Comprehensive Metabolic Panel; Future    Follow Up In Primary Care; Future    Stage 3a chronic kidney disease (Multi)    Orders:     Comprehensive Metabolic Panel    Comprehensive Metabolic Panel; Future    Follow Up In Primary Care; Future    Prediabetes    Orders:    Comprehensive Metabolic Panel    Hemoglobin A1C; Future    Follow Up In Primary Care; Future    Routine general medical examination at health care facility    Orders:    1 Year Follow Up In Primary Care - Wellness Exam; Future           Cardiac Risk Assessment  15 minutes were spent discussing Cardiovascular risk and, if needed, lifestyle modifications were recommended, including nutritional choices, exercise, and elimination of habits contributing to risk.   Aspirin use/disuse was discussed following the guidelines below:  low dose ASA ( mg) should be considered:    If prior Heart Attack/Stroke/Peripheral vascular disease:  Generally recommend daily low dose aspirin unless extremely high bleeding risk (e.g., gastrointestinal).    If no prior Heart Attack/Stroke/Peripheral vascular disease:              Age over 70: Do not use Aspirin for prevention    Age less than 70 and 10-year cardiovascular disease risk is >20%: use low dose Aspirin for prevention.                ASCVD risk counseling:  discussed ASCVD risk and score 14.6%.  Aspirin not indicated at this time. Lifestyle modifications including nutritional choices, exercise and trying to eliminate habits contributing to risk were discussed. Patient agreeable to make efforts to continue to control their current cardiovascular risk factors.  On statin.  Followed by cardiology    Discussed blood work including CMP, lipids, A1c And wellness issues. Reviewed screenings and immunizations. Recommendations given    Refilled meds.  Declines daily gout treatment

## 2025-06-23 NOTE — ASSESSMENT & PLAN NOTE
Orders:    Comprehensive Metabolic Panel    Hemoglobin A1C; Future    Follow Up In Primary Care; Future

## 2025-06-23 NOTE — ASSESSMENT & PLAN NOTE
Orders:    Comprehensive Metabolic Panel    amLODIPine (Norvasc) 10 mg tablet; Take 1 tablet (10 mg) by mouth once daily.    Comprehensive Metabolic Panel; Future    Follow Up In Primary Care; Future

## 2025-06-23 NOTE — ASSESSMENT & PLAN NOTE
Orders:    Comprehensive Metabolic Panel    Comprehensive Metabolic Panel; Future    Follow Up In Primary Care; Future

## 2025-12-29 ENCOUNTER — APPOINTMENT (OUTPATIENT)
Dept: PRIMARY CARE | Facility: CLINIC | Age: 75
End: 2025-12-29
Payer: MEDICARE

## 2026-04-28 ENCOUNTER — APPOINTMENT (OUTPATIENT)
Dept: OBSTETRICS AND GYNECOLOGY | Facility: CLINIC | Age: 76
End: 2026-04-28
Payer: MEDICARE

## 2026-06-23 ENCOUNTER — APPOINTMENT (OUTPATIENT)
Dept: PRIMARY CARE | Facility: CLINIC | Age: 76
End: 2026-06-23
Payer: MEDICARE